# Patient Record
Sex: MALE | Race: WHITE | NOT HISPANIC OR LATINO | Employment: FULL TIME | URBAN - METROPOLITAN AREA
[De-identification: names, ages, dates, MRNs, and addresses within clinical notes are randomized per-mention and may not be internally consistent; named-entity substitution may affect disease eponyms.]

---

## 2017-02-25 ENCOUNTER — APPOINTMENT (EMERGENCY)
Dept: CT IMAGING | Facility: HOSPITAL | Age: 27
End: 2017-02-25
Payer: COMMERCIAL

## 2017-02-25 ENCOUNTER — HOSPITAL ENCOUNTER (EMERGENCY)
Facility: HOSPITAL | Age: 27
Discharge: HOME | End: 2017-02-25
Attending: EMERGENCY MEDICINE | Admitting: EMERGENCY MEDICINE
Payer: COMMERCIAL

## 2017-02-25 VITALS
TEMPERATURE: 98.2 F | HEART RATE: 85 BPM | SYSTOLIC BLOOD PRESSURE: 127 MMHG | OXYGEN SATURATION: 99 % | DIASTOLIC BLOOD PRESSURE: 69 MMHG | RESPIRATION RATE: 20 BRPM | WEIGHT: 181 LBS

## 2017-02-25 DIAGNOSIS — R10.9 ACUTE ABDOMINAL PAIN: Primary | ICD-10-CM

## 2017-02-25 DIAGNOSIS — K52.9 GASTROENTERITIS, ACUTE: ICD-10-CM

## 2017-02-25 LAB
ALBUMIN SERPL BCP-MCNC: 4.4 G/DL (ref 3.5–5)
ALP SERPL-CCNC: 81 U/L (ref 46–116)
ALT SERPL W P-5'-P-CCNC: 24 U/L (ref 12–78)
ANION GAP SERPL CALCULATED.3IONS-SCNC: 10 MMOL/L (ref 4–13)
AST SERPL W P-5'-P-CCNC: 16 U/L (ref 5–45)
BACTERIA UR QL AUTO: ABNORMAL /HPF
BASOPHILS # BLD MANUAL: 0 THOUSAND/UL (ref 0–0.1)
BASOPHILS NFR MAR MANUAL: 0 % (ref 0–1)
BILIRUB SERPL-MCNC: 2.4 MG/DL (ref 0.2–1)
BILIRUB UR QL STRIP: ABNORMAL
BUN SERPL-MCNC: 18 MG/DL (ref 5–25)
CALCIUM SERPL-MCNC: 9 MG/DL (ref 8.3–10.1)
CHLORIDE SERPL-SCNC: 102 MMOL/L (ref 100–108)
CLARITY UR: CLEAR
CO2 SERPL-SCNC: 26 MMOL/L (ref 21–32)
COLOR UR: ABNORMAL
CREAT SERPL-MCNC: 1.29 MG/DL (ref 0.6–1.3)
EOSINOPHIL # BLD MANUAL: 0 THOUSAND/UL (ref 0–0.4)
EOSINOPHIL NFR BLD MANUAL: 0 % (ref 0–6)
ERYTHROCYTE [DISTWIDTH] IN BLOOD BY AUTOMATED COUNT: 12.5 % (ref 11.6–15.1)
GFR SERPL CREATININE-BSD FRML MDRD: >60 ML/MIN/1.73SQ M
GLUCOSE SERPL-MCNC: 134 MG/DL (ref 65–140)
GLUCOSE UR STRIP-MCNC: NEGATIVE MG/DL
HCT VFR BLD AUTO: 52.5 % (ref 36.5–49.3)
HGB BLD-MCNC: 18.4 G/DL (ref 12–17)
HGB UR QL STRIP.AUTO: ABNORMAL
KETONES UR STRIP-MCNC: ABNORMAL MG/DL
LEUKOCYTE ESTERASE UR QL STRIP: NEGATIVE
LIPASE SERPL-CCNC: 58 U/L (ref 73–393)
LYMPHOCYTES # BLD AUTO: 0 % (ref 14–44)
LYMPHOCYTES # BLD AUTO: 0 THOUSAND/UL (ref 0.6–4.47)
MCH RBC QN AUTO: 31 PG (ref 26.8–34.3)
MCHC RBC AUTO-ENTMCNC: 35 G/DL (ref 31.4–37.4)
MCV RBC AUTO: 89 FL (ref 82–98)
MONOCYTES # BLD AUTO: 0.58 THOUSAND/UL (ref 0–1.22)
MONOCYTES NFR BLD: 5 % (ref 4–12)
MUCOUS THREADS UR QL AUTO: ABNORMAL
NEUTROPHILS # BLD MANUAL: 11.03 THOUSAND/UL (ref 1.85–7.62)
NEUTS BAND NFR BLD MANUAL: 8 % (ref 0–8)
NEUTS SEG NFR BLD AUTO: 87 % (ref 43–75)
NITRITE UR QL STRIP: NEGATIVE
NON-SQ EPI CELLS URNS QL MICRO: ABNORMAL /HPF
PH UR STRIP.AUTO: 6.5 [PH] (ref 4.5–8)
PLATELET # BLD AUTO: 231 THOUSANDS/UL (ref 149–390)
PLATELET BLD QL SMEAR: ADEQUATE
PMV BLD AUTO: 11.2 FL (ref 8.9–12.7)
POTASSIUM SERPL-SCNC: 3.5 MMOL/L (ref 3.5–5.3)
PROT SERPL-MCNC: 7.7 G/DL (ref 6.4–8.2)
PROT UR STRIP-MCNC: ABNORMAL MG/DL
RBC # BLD AUTO: 5.93 MILLION/UL (ref 3.88–5.62)
RBC #/AREA URNS AUTO: ABNORMAL /HPF
SODIUM SERPL-SCNC: 138 MMOL/L (ref 136–145)
SP GR UR STRIP.AUTO: 1.01 (ref 1–1.03)
TOTAL CELLS COUNTED SPEC: 100
UROBILINOGEN UR QL STRIP.AUTO: 0.2 E.U./DL
WBC # BLD AUTO: 11.61 THOUSAND/UL (ref 4.31–10.16)
WBC #/AREA URNS AUTO: ABNORMAL /HPF

## 2017-02-25 PROCEDURE — 87086 URINE CULTURE/COLONY COUNT: CPT | Performed by: EMERGENCY MEDICINE

## 2017-02-25 PROCEDURE — 96374 THER/PROPH/DIAG INJ IV PUSH: CPT

## 2017-02-25 PROCEDURE — 80053 COMPREHEN METABOLIC PANEL: CPT | Performed by: EMERGENCY MEDICINE

## 2017-02-25 PROCEDURE — 85007 BL SMEAR W/DIFF WBC COUNT: CPT | Performed by: EMERGENCY MEDICINE

## 2017-02-25 PROCEDURE — 83690 ASSAY OF LIPASE: CPT | Performed by: EMERGENCY MEDICINE

## 2017-02-25 PROCEDURE — 96361 HYDRATE IV INFUSION ADD-ON: CPT

## 2017-02-25 PROCEDURE — 81001 URINALYSIS AUTO W/SCOPE: CPT | Performed by: EMERGENCY MEDICINE

## 2017-02-25 PROCEDURE — 85027 COMPLETE CBC AUTOMATED: CPT | Performed by: EMERGENCY MEDICINE

## 2017-02-25 PROCEDURE — 96376 TX/PRO/DX INJ SAME DRUG ADON: CPT

## 2017-02-25 PROCEDURE — 36415 COLL VENOUS BLD VENIPUNCTURE: CPT | Performed by: EMERGENCY MEDICINE

## 2017-02-25 PROCEDURE — 74177 CT ABD & PELVIS W/CONTRAST: CPT

## 2017-02-25 PROCEDURE — 99284 EMERGENCY DEPT VISIT MOD MDM: CPT

## 2017-02-25 RX ORDER — FENTANYL CITRATE 50 UG/ML
INJECTION, SOLUTION INTRAMUSCULAR; INTRAVENOUS
Status: COMPLETED
Start: 2017-02-25 | End: 2017-02-25

## 2017-02-25 RX ORDER — MORPHINE SULFATE 4 MG/ML
4 INJECTION, SOLUTION INTRAMUSCULAR; INTRAVENOUS ONCE
Status: COMPLETED | OUTPATIENT
Start: 2017-02-25 | End: 2017-02-25

## 2017-02-25 RX ORDER — DICYCLOMINE HCL 20 MG
20 TABLET ORAL 4 TIMES DAILY PRN
Qty: 15 TABLET | Refills: 0 | Status: SHIPPED | OUTPATIENT
Start: 2017-02-25 | End: 2018-03-22

## 2017-02-25 RX ORDER — ONDANSETRON 4 MG/1
4 TABLET, FILM COATED ORAL EVERY 8 HOURS PRN
Qty: 12 TABLET | Refills: 0 | Status: SHIPPED | OUTPATIENT
Start: 2017-02-25 | End: 2018-03-22

## 2017-02-25 RX ORDER — ONDANSETRON 2 MG/ML
INJECTION INTRAMUSCULAR; INTRAVENOUS
Status: COMPLETED
Start: 2017-02-25 | End: 2017-02-25

## 2017-02-25 RX ORDER — HYDROCODONE BITARTRATE AND ACETAMINOPHEN 5; 325 MG/1; MG/1
1 TABLET ORAL EVERY 4 HOURS PRN
Qty: 5 TABLET | Refills: 0 | Status: SHIPPED | OUTPATIENT
Start: 2017-02-25 | End: 2017-02-27

## 2017-02-25 RX ORDER — DICYCLOMINE HCL 20 MG
20 TABLET ORAL ONCE
Status: COMPLETED | OUTPATIENT
Start: 2017-02-25 | End: 2017-02-25

## 2017-02-25 RX ORDER — ALBUTEROL SULFATE 90 UG/1
2 AEROSOL, METERED RESPIRATORY (INHALATION) EVERY 6 HOURS PRN
COMMUNITY

## 2017-02-25 RX ADMIN — SODIUM CHLORIDE 1000 ML: 0.9 INJECTION, SOLUTION INTRAVENOUS at 15:02

## 2017-02-25 RX ADMIN — IOHEXOL 100 ML: 350 INJECTION, SOLUTION INTRAVENOUS at 14:14

## 2017-02-25 RX ADMIN — SODIUM CHLORIDE 1000 ML: 0.9 INJECTION, SOLUTION INTRAVENOUS at 13:01

## 2017-02-25 RX ADMIN — MORPHINE SULFATE 4 MG: 4 INJECTION, SOLUTION INTRAMUSCULAR; INTRAVENOUS at 15:02

## 2017-02-25 RX ADMIN — DICYCLOMINE HYDROCHLORIDE 20 MG: 20 TABLET ORAL at 13:03

## 2017-02-25 RX ADMIN — MORPHINE SULFATE 4 MG: 4 INJECTION, SOLUTION INTRAMUSCULAR; INTRAVENOUS at 13:43

## 2017-02-27 LAB — BACTERIA UR CULT: NORMAL

## 2017-06-02 ENCOUNTER — OFFICE VISIT (OUTPATIENT)
Dept: URGENT CARE | Facility: MEDICAL CENTER | Age: 27
End: 2017-06-02
Payer: COMMERCIAL

## 2017-06-02 PROCEDURE — 99204 OFFICE O/P NEW MOD 45 MIN: CPT

## 2018-03-22 ENCOUNTER — OFFICE VISIT (OUTPATIENT)
Dept: SURGICAL ONCOLOGY | Facility: CLINIC | Age: 28
End: 2018-03-22
Payer: COMMERCIAL

## 2018-03-22 VITALS
HEIGHT: 72 IN | RESPIRATION RATE: 18 BRPM | WEIGHT: 196 LBS | BODY MASS INDEX: 26.55 KG/M2 | SYSTOLIC BLOOD PRESSURE: 140 MMHG | TEMPERATURE: 98.2 F | DIASTOLIC BLOOD PRESSURE: 80 MMHG | HEART RATE: 112 BPM

## 2018-03-22 DIAGNOSIS — N62 GYNECOMASTIA, MALE: Primary | ICD-10-CM

## 2018-03-22 PROBLEM — H00.013 HORDEOLUM OF RIGHT EYE: Status: ACTIVE | Noted: 2017-06-02

## 2018-03-22 PROBLEM — H10.9 CONJUNCTIVITIS, RIGHT EYE: Status: RESOLVED | Noted: 2017-06-02 | Resolved: 2018-03-22

## 2018-03-22 PROBLEM — H10.9 CONJUNCTIVITIS, RIGHT EYE: Status: ACTIVE | Noted: 2017-06-02

## 2018-03-22 PROBLEM — H00.013 HORDEOLUM OF RIGHT EYE: Status: RESOLVED | Noted: 2017-06-02 | Resolved: 2018-03-22

## 2018-03-22 PROBLEM — F31.9 BIPOLAR 1 DISORDER (HCC): Status: ACTIVE | Noted: 2017-12-13

## 2018-03-22 PROCEDURE — 99243 OFF/OP CNSLTJ NEW/EST LOW 30: CPT | Performed by: SURGERY

## 2018-03-22 NOTE — LETTER
March 22, 2018     Anabella Wilks MD  [address]    Patient: Renea Torres   YOB: 1990   Date of Visit: 3/22/2018       Dear Dr Jean John: Thank you for referring Renea Torres to me for evaluation  Below are my notes for this consultation  If you have questions, please do not hesitate to call me  I look forward to following your patient along with you  Sincerely,        Amy Costello MD        CC: No Recipients  Amy Costello MD  3/22/2018  2:27 PM  Sign at close encounter               Surgical Oncology Follow Up       [address] [place] [address] [address]  1990  [id number]  [address] [facility] SURGICAL ONCOLOGY [address] [address]      Chief Complaint:     Chief Complaint   Patient presents with    Breast Mass     Right sided breast mass       Assessment and Plan:   Assessment/Plan   Right breast mass/gynecomastia  Plan bilateral diagnostic mammogram and right ultrasound  Follow-up after imaging    Oncology History:      No history exists  History of Present Illness: The patient was seen in 2014 for small area of gynecomastia on the right side  He was on medications at that time that may have caused this  We had ordered an ultrasound and follow-up however he did not get the ultrasound or follow-up  He has been off those medications and recently has developed in crease seen gynecomastia as well as mastalgia  He now presents for an opinion regarding further management  He is quite tender in this area  Review of Systems:   Review of Systems   Constitutional: Negative for activity change, fatigue and unexpected weight change  HENT: Negative for sore throat, trouble swallowing and voice change  Eyes: Negative for visual disturbance     Respiratory: Negative for apnea, shortness of breath, wheezing and stridor  Cardiovascular: Negative for chest pain and leg swelling  Gastrointestinal: Negative for abdominal distention, abdominal pain, anal bleeding, constipation, diarrhea and vomiting  Genitourinary: Negative for dysuria, flank pain and hematuria  Musculoskeletal: Negative for arthralgias, back pain and myalgias  Skin: Negative for color change and rash  Allergic/Immunologic: Negative for immunocompromised state  Neurological: Negative for dizziness, seizures, syncope, weakness and headaches  Hematological: Negative for adenopathy  Psychiatric/Behavioral: Negative for agitation and behavioral problems  Past Medical History:      Patient Active Problem List   Diagnosis    Anxiety and depression    Asthma    Bipolar 1 disorder ([facility] [address] )    Mass of right breast    Panic attack        Past Medical History:   Diagnosis Date    Asthma         Past Surgical History:   Procedure Laterality Date    FOOT SURGERY      right        Family History   Problem Relation Age of Onset    Breast cancer Mother     Breast cancer Cousin         Social History     Social History    Marital status: /Civil Union     Spouse name: N/A    Number of children: N/A    Years of education: N/A     Occupational History    Not on file       Social History Main Topics    Smoking status: Current Every Day Smoker     Packs/day: 0 50    Smokeless tobacco: Never Used    Alcohol use Yes      Comment: socially    Drug use: No    Sexual activity: Not on file     Other Topics Concern    Not on file     Social History Narrative    No narrative on file        Current Outpatient Prescriptions:     albuterol (PROVENTIL HFA,VENTOLIN HFA) 90 mcg/act inhaler, Inhale 2 puffs every 6 (six) hours as needed for wheezing, Disp: , Rfl:    No Known Allergies    Physical Exam:     Vitals:    03/22/18 1345   BP: 140/80   Pulse: (!) 112   Resp: 18   Temp: 98 2 °F (36 8 °C)     Physical Exam   Pulmonary/Chest:   Examination of the right breast demonstrates relative prominent gynecomastia in the lateral aspect of the breast   Ultrasound interrogation is consistent with gynecomastia as opposed to is single mass  The left breast was entirely normal by ultrasound exam   There is no axillary adenopathy on either side  Results:   Pathology:  Not applicable  Imaging  See physical exam for ultrasound findings    Discussion/Summary:   The patient has a relatively rapid onset by history of increasing gynecomastia which is so she with considerable mastalgia  I have recommended formal imaging consistent with bilateral diagnostic mammogram as well as a right ultrasound  We will see the patient back following that  Given the significant change as well as the discomfort the patient strongly prefers to have this surgically removed  We will discuss this at her next visit though it certainly seems reasonable given his discomfort  Advance Care Planning/Advance Directives:  I discussed the disease status, treatment plans and follow-up with the patient

## 2018-03-22 NOTE — PROGRESS NOTES
Surgical Oncology Follow Up       [address] [facility] [facility]  [facility] [address]  1990  [id number]  [address] [facility] [facility]  [facility] SURGICAL ONCOLOGY [place]  [address] [place] [id number]      Chief Complaint:     Chief Complaint   Patient presents with    Breast Mass     Right sided breast mass       Assessment and Plan:   Assessment/Plan   Right breast mass/gynecomastia  Plan bilateral diagnostic mammogram and right ultrasound  Follow-up after imaging    Oncology History:      No history exists  History of Present Illness: The patient was seen in 2014 for small area of gynecomastia on the right side  He was on medications at that time that may have caused this  We had ordered an ultrasound and follow-up however he did not get the ultrasound or follow-up  He has been off those medications and recently has developed in crease seen gynecomastia as well as mastalgia  He now presents for an opinion regarding further management  He is quite tender in this area  Review of Systems:   Review of Systems   Constitutional: Negative for activity change, fatigue and unexpected weight change  HENT: Negative for sore throat, trouble swallowing and voice change  Eyes: Negative for visual disturbance  Respiratory: Negative for apnea, shortness of breath, wheezing and stridor  Cardiovascular: Negative for chest pain and leg swelling  Gastrointestinal: Negative for abdominal distention, abdominal pain, anal bleeding, constipation, diarrhea and vomiting  Genitourinary: Negative for dysuria, flank pain and hematuria  Musculoskeletal: Negative for arthralgias, back pain and myalgias  Skin: Negative for color change and rash  Allergic/Immunologic: Negative for immunocompromised state  Neurological: Negative for dizziness, seizures, syncope, weakness and headaches  Hematological: Negative for adenopathy  Psychiatric/Behavioral: Negative for agitation and behavioral problems  Past Medical History:      Patient Active Problem List   Diagnosis    Anxiety and depression    Asthma    Bipolar 1 disorder ([address] )    Mass of right breast    Panic attack        Past Medical History:   Diagnosis Date    Asthma         Past Surgical History:   Procedure Laterality Date    FOOT SURGERY      right        Family History   Problem Relation Age of Onset    Breast cancer Mother     Breast cancer Cousin         Social History     Social History    Marital status: /Civil Union     Spouse name: N/A    Number of children: N/A    Years of education: N/A     Occupational History    Not on file  Social History Main Topics    Smoking status: Current Every Day Smoker     Packs/day: 0 50    Smokeless tobacco: Never Used    Alcohol use Yes      Comment: socially    Drug use: No    Sexual activity: Not on file     Other Topics Concern    Not on file     Social History Narrative    No narrative on file        Current Outpatient Prescriptions:     albuterol (PROVENTIL HFA,VENTOLIN HFA) 90 mcg/act inhaler, Inhale 2 puffs every 6 (six) hours as needed for wheezing, Disp: , Rfl:    No Known Allergies    Physical Exam:     Vitals:    03/22/18 1345   BP: 140/80   Pulse: (!) 112   Resp: 18   Temp: 98 2 °F (36 8 °C)     Physical Exam   Pulmonary/Chest:   Examination of the right breast demonstrates relative prominent gynecomastia in the lateral aspect of the breast   Ultrasound interrogation is consistent with gynecomastia as opposed to is single mass  The left breast was entirely normal by ultrasound exam   There is no axillary adenopathy on either side         Results:   Pathology:  Not applicable  Imaging  See physical exam for ultrasound findings    Discussion/Summary:   The patient has a relatively rapid onset by history of increasing gynecomastia which is so she with considerable mastalgia  I have recommended formal imaging consistent with bilateral diagnostic mammogram as well as a right ultrasound  We will see the patient back following that  Given the significant change as well as the discomfort the patient strongly prefers to have this surgically removed  We will discuss this at her next visit though it certainly seems reasonable given his discomfort  Advance Care Planning/Advance Directives:  I discussed the disease status, treatment plans and follow-up with the patient

## 2018-03-30 ENCOUNTER — HOSPITAL ENCOUNTER (OUTPATIENT)
Dept: ULTRASOUND IMAGING | Facility: CLINIC | Age: 28
Discharge: HOME | End: 2018-03-30
Payer: COMMERCIAL

## 2018-03-30 ENCOUNTER — HOSPITAL ENCOUNTER (OUTPATIENT)
Dept: MAMMOGRAPHY | Facility: CLINIC | Age: 28
Discharge: HOME | End: 2018-03-30
Payer: COMMERCIAL

## 2018-03-30 DIAGNOSIS — N62 GYNECOMASTIA, MALE: ICD-10-CM

## 2018-03-30 PROCEDURE — 76642 ULTRASOUND BREAST LIMITED: CPT

## 2018-03-30 PROCEDURE — 77066 DX MAMMO INCL CAD BI: CPT

## 2018-04-05 ENCOUNTER — OFFICE VISIT (OUTPATIENT)
Dept: SURGICAL ONCOLOGY | Facility: CLINIC | Age: 28
End: 2018-04-05
Payer: COMMERCIAL

## 2018-04-05 VITALS
TEMPERATURE: 98.7 F | RESPIRATION RATE: 16 BRPM | DIASTOLIC BLOOD PRESSURE: 70 MMHG | BODY MASS INDEX: 26.46 KG/M2 | HEART RATE: 84 BPM | HEIGHT: 71 IN | SYSTOLIC BLOOD PRESSURE: 108 MMHG | WEIGHT: 189 LBS

## 2018-04-05 DIAGNOSIS — N62 GYNECOMASTIA, MALE: ICD-10-CM

## 2018-04-05 DIAGNOSIS — N63.10 MASS OF RIGHT BREAST: Primary | ICD-10-CM

## 2018-04-05 PROCEDURE — 99214 OFFICE O/P EST MOD 30 MIN: CPT | Performed by: SURGERY

## 2018-04-05 RX ORDER — CARBAMAZEPINE 200 MG/1
400 TABLET ORAL 2 TIMES DAILY
COMMUNITY
End: 2019-10-06

## 2018-04-05 NOTE — PROGRESS NOTES
Surgical Oncology Follow Up       [address] [place] [address]  [facility] [address]  1990  [id number]  [address] [place] [address]  [facility] SURGICAL ONCOLOGY [place] [id number]    Chief Complaint   Patient presents with   Yannick Downy     Pt is here for a two week follow up          Assessment & Plan:   Assessment/Plan   Patient has persistent gynecomastia and mastalgia   I have recommended a right mastectomy, nipple sparing  Cancer History:      No history exists  History of Present Illness: The patient was seen in 2014 for small area of gynecomastia on the right side  He was on medications at that time that may have caused this  We had ordered an ultrasound and follow-up however he did not get the ultrasound or follow-up  He has been off those medications and recently has developed in crease seen gynecomastia as well as mastalgia  He now presents for an opinion regarding further management  He is quite tender in this area  He has had a mammogram and ultrasound which demonstrated no evidence of a mass however does have significant unilateral gynecomastia  Interval History:   See above    Review of Systems:   Review of Systems   Constitutional: Negative for activity change, fatigue and unexpected weight change  HENT: Negative for sore throat, trouble swallowing and voice change  Eyes: Negative for visual disturbance  Respiratory: Negative for apnea, shortness of breath, wheezing and stridor  Cardiovascular: Negative for chest pain and leg swelling  Gastrointestinal: Negative for abdominal distention, abdominal pain, anal bleeding, constipation, diarrhea and vomiting  Genitourinary: Negative for dysuria, flank pain and hematuria  Musculoskeletal: Negative for arthralgias, back pain and myalgias  Skin: Negative for color change and rash  Allergic/Immunologic: Negative for immunocompromised state  Neurological: Negative for dizziness, seizures, syncope, weakness and headaches  Hematological: Negative for adenopathy  Psychiatric/Behavioral: Negative for agitation and behavioral problems  Past Medical History     Patient Active Problem List   Diagnosis    Anxiety and depression    Asthma    Bipolar 1 disorder ([address] )    Mass of right breast    Panic attack     Past Medical History:   Diagnosis Date    Asthma      Past Surgical History:   Procedure Laterality Date    FOOT SURGERY      right     Family History   Problem Relation Age of Onset    Breast cancer Mother     Breast cancer Cousin      Social History     Social History    Marital status: /Civil Union     Spouse name: N/A    Number of children: N/A    Years of education: N/A     Occupational History    Not on file  Social History Main Topics    Smoking status: Current Every Day Smoker     Packs/day: 0 50    Smokeless tobacco: Never Used    Alcohol use Yes      Comment: socially    Drug use: No    Sexual activity: Not on file     Other Topics Concern    Not on file     Social History Narrative    No narrative on file       Current Outpatient Prescriptions:     carBAMazepine (TEGretol) 200 mg tablet, Take 400 mg by mouth 2 (two) times a day, Disp: , Rfl:     albuterol (PROVENTIL HFA,VENTOLIN HFA) 90 mcg/act inhaler, Inhale 2 puffs every 6 (six) hours as needed for wheezing, Disp: , Rfl:   No Known Allergies    Physical Exam:     Vitals:    04/05/18 0845   BP: 108/70   Pulse: 84   Resp: 16   Temp: 98 7 °F (37 1 °C)     Physical Exam   Pulmonary/Chest:       Patient has obvious gynecomastia on exam   This is quite tender  There is no axillary adenopathy  The left breast was entirely normal          Results:   I reviewed the mammogram and ultrasound I concur with report      Discussion/Summary:   Given the patient's progressive gynecomastia as well as mastalgia this is unlikely to be relieved by vitamin-E and/or primrose oil  I recommended a nipple sparing right mastectomy  We subsequent to the process of informed consent for this procedure  All questions were answered the patient's satisfaction  We will coordinate the surgery next mutual convenience  Advance Care Planning/Advance Directives:  I discussed the disease status, treatment plans and follow-up with the patient

## 2018-04-05 NOTE — PATIENT INSTRUCTIONS
Mastectomy   AMBULATORY CARE:   What you need to know about a mastectomy:  A mastectomy is surgery to remove all or part of your breast  Tissue, lymph nodes, or muscle near the breast may also be removed  A mastectomy is done to treat breast cancer and prevent cancer from spreading  A mastectomy can also be done to prevent breast cancer  This may be a choice if you are at high risk for breast cancer  The type of mastectomy you need may depend on the size of the tumor  It may also depend on if the cancer has spread  How to prepare for a mastectomy:  Your healthcare provider will talk to you about how to prepare for surgery  He may tell you not to eat or drink anything after midnight on the day of your surgery  He will tell you what medicines to take or not take on the day of your surgery  You may need to stop taking aspirin or blood thinners several days before surgery  Arrange for someone to drive you home and stay with you after surgery  This person can help care for you and watch for complications from surgery  What will happen during a mastectomy:   · You will be given general anesthesia to keep you asleep and free from pain during surgery  You may be given an antibiotic through your IV to help prevent a bacterial infection  Your healthcare provider will make an incision over your breast  He will remove the tumor and breast tissue  He may also remove muscle from behind your breast  If lymph nodes will be taken, a small incision will be made in your armpit  The lymph nodes will be removed and tested for cancer  · One or more drains may be inserted near your incision  A drain removes extra fluid and helps your incision heal  Your healthcare provider will close your incision with stitches and cover it with a bandage  He may also wrap a tight-fitting bandage around both of your breasts  This may decrease swelling, bleeding, and pain    What will happen after a mastectomy:  Healthcare providers will monitor you until you are awake  You may need to spend 1 to 2 nights in the hospital  You may have difficulty moving your arm closest to your mastectomy  This should get better in a few days  Get out of bed and walk when your healthcare provider says it is safe  This will help prevent blood clots  Risks of a mastectomy:  You may bleed more than expected or get an infection  Nerves, blood vessels, and muscles may be damaged during your surgery  Blood or fluid may collect under your skin  You may need other procedures to remove the fluid or blood  You may have swelling in your arm closest to the mastectomy or where lymph nodes were removed  This swelling is called lymphedema  Lymphedema may cause tingling, numbness, stiffness, and weakness in your arm  This may be permanent  You may get a blood clot in your arm or leg  The blood clot may travel to your heart, lungs, or brain  This may become life-threatening  Call 911 for any of the following:   · You feel lightheaded, short of breath, and have chest pain  · You cough up blood  · You have trouble breathing  Seek care immediately if:   · Blood soaks through your bandage  · Your stitches come apart  · Your bruise suddenly gets bigger  · Your leg or arm is larger than normal and painful  Contact your healthcare provider if:   · You have a fever or chills  · Your wound is red, swollen, or draining pus  · You have nausea or are vomiting  · Your skin is itchy, swollen, or you have a rash  · Your pain does not get better after you take pain medicine  · Your drain falls out or stops draining fluid  · Your drain has pus or foul-smelling fluid coming out of it  · You have numbness, tingling, or swelling in your arm or hand  · You feel very sad or anxious  · You have trouble coping with your condition  · You have questions or concerns about your condition or care  Medicines:   You may need any of the following:  · Antibiotics  help prevent a bacterial infection  · Prescription pain medicine  may be given  Ask your healthcare provider how to take this medicine safely  Some prescription pain medicines contain acetaminophen  Do not take other medicines that contain acetaminophen without talking to your healthcare provider  Too much acetaminophen may cause liver damage  Prescription pain medicine may cause constipation  Ask your healthcare provider how to prevent or treat constipation  · NSAIDs , such as ibuprofen, help decrease swelling, pain, and fever  NSAIDs can cause stomach bleeding or kidney problems in certain people  If you take blood thinner medicine, always ask your healthcare provider if NSAIDs are safe for you  Always read the medicine label and follow directions  · Take your medicine as directed  Contact your healthcare provider if you think your medicine is not helping or if you have side effects  Tell him or her if you are allergic to any medicine  Keep a list of the medicines, vitamins, and herbs you take  Include the amounts, and when and why you take them  Bring the list or the pill bottles to follow-up visits  Carry your medicine list with you in case of an emergency  Care for your wound as directed: If you have a tight-fitting bandage, you can remove it in 24 to 48 hours, or as directed  Ask your healthcare provider when your incision can get wet  You may need to take a sponge bath until your drain is removed  Carefully wash around the incision with soap and water  It is okay to allow the soap and water to gently run over your incision  Gently pat dry the area and put on new, clean bandages as directed  Change your bandages when they get wet or dirty  If lymph nodes were removed from your armpit, ask your healthcare provider when you can wear deodorant  Check your incision every day for redness, pus, or swelling  Self-care:   · Apply ice  on your incision for 15 to 20 minutes every hour or as directed   Use an ice pack, or put crushed ice in a plastic bag  Cover it with a towel  Ice helps prevent tissue damage and decreases swelling and pain  · Elevate  your arm nearest to your incision above the level of your heart  Do this as often as you can  This will help decrease swelling and pain  Prop your arm on pillows or blankets to keep it elevated comfortably  · Rest  as directed  Do not lift anything heavier than 5 pounds  Do not push or pull with your arms  You can use your arms to groom, eat, and bathe  Take short walks around the house  Gradually walk further as you feel better  Ask your healthcare provider when you can return to your normal activities  · Do not sleep on your stomach  This will put too much pressure on your incision  Sleep on your back or on the side opposite to your incision  · Empty your drain  as directed  You may need to write down how much fluid you empty from your drain each day  Ask your healthcare provider for more information about how to empty your drain  · Wear a supportive bra  as directed  Wait until you remove the tight-fitting bandage to wear a bra  You may be given a surgical bra or told to wear a sports bra  A supportive bra may help hold your bandages in place  It may also help with swelling and pain  Do not  wear bras with lace or underwire  They may rub against your incision and cause discomfort  Arm stretches: Your healthcare provider may show you how to do arm stretches  Arm stretches may prevent stiff arms or shoulders  You may need to wait until after your drains are removed to begin stretching  Do not do arm stretches until your healthcare provider says it is okay  Ask your healthcare provider for more information about arm stretches  More information and support: You may have difficulty coping with the changes to your body  Talk to your family or friends about how you are feeling  Ask your healthcare provider about support groups   It may be helpful to talk with other women who have had a mastectomy  · [address]  [place] [address]  Phone: [phone]  Web Address: http://[employer]/  [employer]  · [address] [place] [address], [address] [place] [address]  Phone: 8- 436 - [phone]  Web Address: http://[employer]/  gov  Follow up with your healthcare provider as directed:  Write down your questions so you remember to ask them during your visits  © 2017 [facility] Information is for End User's use only and may not be sold, redistributed or otherwise used for commercial purposes  All illustrations and images included in CareNotes® are the copyrighted property of A D A M [employer] Inc  or Surendra Perez  The above information is an  only  It is not intended as medical advice for individual conditions or treatments  Talk to your doctor, nurse or pharmacist before following any medical regimen to see if it is safe and effective for you

## 2018-04-05 NOTE — LETTER
April 5, 2018     Threglendaa Ora  [facility]    Patient: Eneida Jha   YOB: 1990   Date of Visit: 4/5/2018       Dear Dr Lucio Matar: Thank you for referring Eneida Jha to me for evaluation  Below are my notes for this consultation  If you have questions, please do not hesitate to call me  I look forward to following your patient along with you  Sincerely,        Inés Bustos MD        CC: No Recipients  Inés Bustos MD  4/5/2018  9:09 AM  Sign at close encounter     Surgical Oncology Follow Up       [address] [place] [address] [facility]  1990  [id number]  [address] [facility] SURGICAL ONCOLOGY [place] [address]    Chief Complaint   Patient presents with   Michelle Licona     Pt is here for a two week follow up          Assessment & Plan:   Assessment/Plan   Patient has persistent gynecomastia and mastalgia   I have recommended a right mastectomy, nipple sparing  Cancer History:      No history exists  History of Present Illness: The patient was seen in 2014 for small area of gynecomastia on the right side  He was on medications at that time that may have caused this  We had ordered an ultrasound and follow-up however he did not get the ultrasound or follow-up  He has been off those medications and recently has developed in crease seen gynecomastia as well as mastalgia  He now presents for an opinion regarding further management  He is quite tender in this area  He has had a mammogram and ultrasound which demonstrated no evidence of a mass however does have significant unilateral gynecomastia  Interval History:   See above    Review of Systems:   Review of Systems   Constitutional: Negative for activity change, fatigue and unexpected weight change     HENT: Negative for sore throat, trouble swallowing and voice change  Eyes: Negative for visual disturbance  Respiratory: Negative for apnea, shortness of breath, wheezing and stridor  Cardiovascular: Negative for chest pain and leg swelling  Gastrointestinal: Negative for abdominal distention, abdominal pain, anal bleeding, constipation, diarrhea and vomiting  Genitourinary: Negative for dysuria, flank pain and hematuria  Musculoskeletal: Negative for arthralgias, back pain and myalgias  Skin: Negative for color change and rash  Allergic/Immunologic: Negative for immunocompromised state  Neurological: Negative for dizziness, seizures, syncope, weakness and headaches  Hematological: Negative for adenopathy  Psychiatric/Behavioral: Negative for agitation and behavioral problems  Past Medical History     Patient Active Problem List   Diagnosis    Anxiety and depression    Asthma    Bipolar 1 disorder ([facility] [address] )    Mass of right breast    Panic attack     Past Medical History:   Diagnosis Date    Asthma      Past Surgical History:   Procedure Laterality Date    FOOT SURGERY      right     Family History   Problem Relation Age of Onset    Breast cancer Mother     Breast cancer Cousin      Social History     Social History    Marital status: /Civil Union     Spouse name: N/A    Number of children: N/A    Years of education: N/A     Occupational History    Not on file       Social History Main Topics    Smoking status: Current Every Day Smoker     Packs/day: 0 50    Smokeless tobacco: Never Used    Alcohol use Yes      Comment: socially    Drug use: No    Sexual activity: Not on file     Other Topics Concern    Not on file     Social History Narrative    No narrative on file       Current Outpatient Prescriptions:     carBAMazepine (TEGretol) 200 mg tablet, Take 400 mg by mouth 2 (two) times a day, Disp: , Rfl:     albuterol (PROVENTIL HFA,VENTOLIN HFA) 90 mcg/act inhaler, Inhale 2 puffs every 6 (six) hours as needed for wheezing, Disp: , Rfl:   No Known Allergies    Physical Exam:     Vitals:    04/05/18 0845   BP: 108/70   Pulse: 84   Resp: 16   Temp: 98 7 °F (37 1 °C)     Physical Exam   Pulmonary/Chest:       Patient has obvious gynecomastia on exam   This is quite tender  There is no axillary adenopathy  The left breast was entirely normal          Results:   I reviewed the mammogram and ultrasound I concur with report  Discussion/Summary:   Given the patient's progressive gynecomastia as well as mastalgia this is unlikely to be relieved by vitamin-E and/or primrose oil  I recommended a nipple sparing right mastectomy  We subsequent to the process of informed consent for this procedure  All questions were answered the patient's satisfaction  We will coordinate the surgery next mutual convenience  Advance Care Planning/Advance Directives:  I discussed the disease status, treatment plans and follow-up with the patient

## 2018-04-11 ENCOUNTER — APPOINTMENT (OUTPATIENT)
Dept: RADIOLOGY | Facility: CLINIC | Age: 28
End: 2018-04-11
Payer: COMMERCIAL

## 2018-04-11 ENCOUNTER — TRANSCRIBE ORDERS (OUTPATIENT)
Dept: LAB | Facility: CLINIC | Age: 28
End: 2018-04-11

## 2018-04-11 ENCOUNTER — APPOINTMENT (OUTPATIENT)
Dept: LAB | Facility: CLINIC | Age: 28
End: 2018-04-11
Payer: COMMERCIAL

## 2018-04-11 ENCOUNTER — TRANSCRIBE ORDERS (OUTPATIENT)
Dept: RADIOLOGY | Facility: CLINIC | Age: 28
End: 2018-04-11

## 2018-04-11 DIAGNOSIS — N62 GYNECOMASTIA, MALE: ICD-10-CM

## 2018-04-11 DIAGNOSIS — N63.10 MASS OF RIGHT BREAST: ICD-10-CM

## 2018-04-11 PROCEDURE — 93005 ELECTROCARDIOGRAM TRACING: CPT

## 2018-04-11 PROCEDURE — 71046 X-RAY EXAM CHEST 2 VIEWS: CPT

## 2018-04-11 NOTE — PRE-PROCEDURE INSTRUCTIONS
Pre-Surgery Instructions:   Medication Instructions    albuterol (PROVENTIL HFA,VENTOLIN HFA) 90 mcg/act inhaler Instructed patient per Anesthesia Guidelines   carBAMazepine (TEGretol) 200 mg tablet Instructed patient per Anesthesia Guidelines  Pre op and bathing instructions reviewed   Pt will get hibiclens

## 2018-04-16 ENCOUNTER — ANESTHESIA EVENT (OUTPATIENT)
Dept: PERIOP | Facility: HOSPITAL | Age: 28
End: 2018-04-16
Payer: COMMERCIAL

## 2018-04-16 RX ORDER — DEXTROAMPHETAMINE SACCHARATE, AMPHETAMINE ASPARTATE, DEXTROAMPHETAMINE SULFATE AND AMPHETAMINE SULFATE 2.5; 2.5; 2.5; 2.5 MG/1; MG/1; MG/1; MG/1
10 TABLET ORAL DAILY
COMMUNITY
End: 2020-10-31 | Stop reason: CLARIF

## 2018-04-17 ENCOUNTER — HOSPITAL ENCOUNTER (OUTPATIENT)
Facility: HOSPITAL | Age: 28
Setting detail: OUTPATIENT SURGERY
Discharge: HOME | End: 2018-04-17
Attending: SURGERY | Admitting: SURGERY
Payer: COMMERCIAL

## 2018-04-17 ENCOUNTER — ANESTHESIA (OUTPATIENT)
Dept: PERIOP | Facility: HOSPITAL | Age: 28
End: 2018-04-17
Payer: COMMERCIAL

## 2018-04-17 VITALS
BODY MASS INDEX: 25.03 KG/M2 | WEIGHT: 195 LBS | TEMPERATURE: 97.9 F | SYSTOLIC BLOOD PRESSURE: 140 MMHG | DIASTOLIC BLOOD PRESSURE: 87 MMHG | HEIGHT: 74 IN | HEART RATE: 81 BPM | OXYGEN SATURATION: 96 % | RESPIRATION RATE: 18 BRPM

## 2018-04-17 DIAGNOSIS — N62 GYNECOMASTIA, MALE: ICD-10-CM

## 2018-04-17 DIAGNOSIS — N63.10 MASS OF RIGHT BREAST: ICD-10-CM

## 2018-04-17 PROCEDURE — 19303 MAST SIMPLE COMPLETE: CPT | Performed by: SURGERY

## 2018-04-17 PROCEDURE — 19303 MAST SIMPLE COMPLETE: CPT | Performed by: PHYSICIAN ASSISTANT

## 2018-04-17 PROCEDURE — 88307 TISSUE EXAM BY PATHOLOGIST: CPT | Performed by: PATHOLOGY

## 2018-04-17 RX ORDER — OXYCODONE HYDROCHLORIDE AND ACETAMINOPHEN 5; 325 MG/1; MG/1
1 TABLET ORAL EVERY 4 HOURS PRN
Status: DISCONTINUED | OUTPATIENT
Start: 2018-04-17 | End: 2018-04-17 | Stop reason: HOSPADM

## 2018-04-17 RX ORDER — GLYCOPYRROLATE 0.2 MG/ML
INJECTION INTRAMUSCULAR; INTRAVENOUS AS NEEDED
Status: DISCONTINUED | OUTPATIENT
Start: 2018-04-17 | End: 2018-04-17 | Stop reason: SURG

## 2018-04-17 RX ORDER — MIDAZOLAM HYDROCHLORIDE 1 MG/ML
INJECTION INTRAMUSCULAR; INTRAVENOUS AS NEEDED
Status: DISCONTINUED | OUTPATIENT
Start: 2018-04-17 | End: 2018-04-17 | Stop reason: SURG

## 2018-04-17 RX ORDER — SODIUM CHLORIDE, SODIUM LACTATE, POTASSIUM CHLORIDE, CALCIUM CHLORIDE 600; 310; 30; 20 MG/100ML; MG/100ML; MG/100ML; MG/100ML
125 INJECTION, SOLUTION INTRAVENOUS CONTINUOUS
Status: DISCONTINUED | OUTPATIENT
Start: 2018-04-17 | End: 2018-04-17 | Stop reason: HOSPADM

## 2018-04-17 RX ORDER — MAGNESIUM HYDROXIDE 1200 MG/15ML
LIQUID ORAL AS NEEDED
Status: DISCONTINUED | OUTPATIENT
Start: 2018-04-17 | End: 2018-04-17 | Stop reason: HOSPADM

## 2018-04-17 RX ORDER — BUPIVACAINE HYDROCHLORIDE AND EPINEPHRINE 2.5; 5 MG/ML; UG/ML
INJECTION, SOLUTION EPIDURAL; INFILTRATION; INTRACAUDAL; PERINEURAL AS NEEDED
Status: DISCONTINUED | OUTPATIENT
Start: 2018-04-17 | End: 2018-04-17 | Stop reason: HOSPADM

## 2018-04-17 RX ORDER — FENTANYL CITRATE 50 UG/ML
INJECTION, SOLUTION INTRAMUSCULAR; INTRAVENOUS AS NEEDED
Status: DISCONTINUED | OUTPATIENT
Start: 2018-04-17 | End: 2018-04-17 | Stop reason: SURG

## 2018-04-17 RX ORDER — KETOROLAC TROMETHAMINE 30 MG/ML
INJECTION, SOLUTION INTRAMUSCULAR; INTRAVENOUS AS NEEDED
Status: DISCONTINUED | OUTPATIENT
Start: 2018-04-17 | End: 2018-04-17 | Stop reason: SURG

## 2018-04-17 RX ORDER — FENTANYL CITRATE/PF 50 MCG/ML
25 SYRINGE (ML) INJECTION
Status: DISCONTINUED | OUTPATIENT
Start: 2018-04-17 | End: 2018-04-17 | Stop reason: HOSPADM

## 2018-04-17 RX ORDER — PROPOFOL 10 MG/ML
INJECTION, EMULSION INTRAVENOUS AS NEEDED
Status: DISCONTINUED | OUTPATIENT
Start: 2018-04-17 | End: 2018-04-17 | Stop reason: SURG

## 2018-04-17 RX ORDER — SODIUM CHLORIDE 9 MG/ML
INJECTION, SOLUTION INTRAVENOUS CONTINUOUS PRN
Status: DISCONTINUED | OUTPATIENT
Start: 2018-04-17 | End: 2018-04-17 | Stop reason: SURG

## 2018-04-17 RX ORDER — ONDANSETRON 2 MG/ML
INJECTION INTRAMUSCULAR; INTRAVENOUS AS NEEDED
Status: DISCONTINUED | OUTPATIENT
Start: 2018-04-17 | End: 2018-04-17 | Stop reason: SURG

## 2018-04-17 RX ADMIN — PROPOFOL 200 MG: 10 INJECTION, EMULSION INTRAVENOUS at 08:07

## 2018-04-17 RX ADMIN — FENTANYL CITRATE 50 MCG: 50 INJECTION INTRAMUSCULAR; INTRAVENOUS at 08:39

## 2018-04-17 RX ADMIN — FENTANYL CITRATE 25 MCG: 50 INJECTION, SOLUTION INTRAMUSCULAR; INTRAVENOUS at 09:12

## 2018-04-17 RX ADMIN — FENTANYL CITRATE 50 MCG: 50 INJECTION INTRAMUSCULAR; INTRAVENOUS at 08:16

## 2018-04-17 RX ADMIN — FENTANYL CITRATE 25 MCG: 50 INJECTION, SOLUTION INTRAMUSCULAR; INTRAVENOUS at 09:20

## 2018-04-17 RX ADMIN — KETOROLAC TROMETHAMINE 30 MG: 30 INJECTION, SOLUTION INTRAMUSCULAR at 08:37

## 2018-04-17 RX ADMIN — CEFAZOLIN SODIUM 2000 MG: 2 SOLUTION INTRAVENOUS at 08:03

## 2018-04-17 RX ADMIN — DEXAMETHASONE SODIUM PHOSPHATE 10 MG: 10 INJECTION INTRAMUSCULAR; INTRAVENOUS at 08:07

## 2018-04-17 RX ADMIN — OXYCODONE HYDROCHLORIDE AND ACETAMINOPHEN 1 TABLET: 5; 325 TABLET ORAL at 10:05

## 2018-04-17 RX ADMIN — MIDAZOLAM HYDROCHLORIDE 2 MG: 1 INJECTION, SOLUTION INTRAMUSCULAR; INTRAVENOUS at 08:03

## 2018-04-17 RX ADMIN — ONDANSETRON 4 MG: 2 INJECTION INTRAMUSCULAR; INTRAVENOUS at 08:37

## 2018-04-17 RX ADMIN — FENTANYL CITRATE 25 MCG: 50 INJECTION, SOLUTION INTRAMUSCULAR; INTRAVENOUS at 09:25

## 2018-04-17 RX ADMIN — GLYCOPYRROLATE 0.2 MG: 0.2 INJECTION, SOLUTION INTRAMUSCULAR; INTRAVENOUS at 08:07

## 2018-04-17 RX ADMIN — FENTANYL CITRATE 25 MCG: 50 INJECTION, SOLUTION INTRAMUSCULAR; INTRAVENOUS at 09:31

## 2018-04-17 RX ADMIN — SODIUM CHLORIDE: 0.9 INJECTION, SOLUTION INTRAVENOUS at 07:39

## 2018-04-17 NOTE — ANESTHESIA POSTPROCEDURE EVALUATION
Post-Op Assessment Note      CV Status:  Stable    Hydration Status:  Euvolemic    PONV Controlled:  Controlled    Airway Patency:  Patent    Post Op Vitals Reviewed: Yes          Staff: CRNA       Comments: sleeping vss report rn          BP      Temp      Pulse     Resp      SpO2

## 2018-04-17 NOTE — OP NOTE
OPERATIVE REPORT  PATIENT NAME: Rubén Ladd    :  1990  MRN: [id number]  Pt Location: [facility] OR ROOM 02    SURGERY DATE: 2018    Surgeon(s) and Role:     * Fahad Lowry MD - Primary     * Meredith Elaine PA-C - Assisting    Preop Diagnosis:  Gynecomastia, male [N62]  Mass of right breast [N63 10]    Post-Op Diagnosis Codes:     * Gynecomastia, male [N62]     * Mass of right breast [N63 10]    Procedure(s) (LRB):  MASTECTOMY SIMPLE: NIPPLE SPARING (Right)    Specimen(s):  ID Type Source Tests Collected by Time Destination   1 : right breast, sutures short superior, long lateral, medium medial Tissue Breast, Right TISSUE EXAM Fahad Lowyr MD 2018 [id number]        Estimated Blood Loss:   Minimal    Drains: none       Anesthesia Type:   General    Operative Indications:  Gynecomastia, male [N62]  Mass of right breast [N63 10]      Operative Findings:  Significant gynecomastia with dense breast tissue    Complications:   None    Procedure and Technique:  The patient was brought to the operation room and placed under general anesthesia   Attention was paid to ensure appropriate padding and correct positioning   The right breast was prepped and draped in a sterile fashion   I initiated a time-out, identifying the patient, the correct side and the above procedure   All parties agreed with the time out      The planned vic-areolar  incision was then injected with 0 25% Marcaine and epinephrine for local anesthesia   The incision was sharply incised and extended for 1 cm laterally   Thin flaps were then elevated using electrocautery starting superiorly and then continuing medially, inferiorly and finally laterally   The tail of Subha Fly was then dissected away from the axilla proper leaving the breast tethered to the underlying musculature   The breast was then removed from the muscles in a sub-facial plane   The breast was oriented with sutures (short=superior; medium=medial; long=lateral)   The breast was sent to pathology in formalin for permanent pathologic evaluation  Meticulous hemostasis was maintained with electrocautery   The wound was extensively irrigated closed in two layers - an inner layer of 2-0 vicryl and a subcuticular closure with 4-0 Monocryl  Steri-strips were applied  The counts were correct x2       A qualified resident physician was not available    Patient Disposition:  PACU     SIGNATURE: Tex Edwards MD  DATE: April 17, 2018  TIME: 8:44 AM

## 2018-04-17 NOTE — ANESTHESIA PREPROCEDURE EVALUATION
Review of Systems/Medical History      No history of anesthetic complications     Cardiovascular  Negative cardio ROS    Pulmonary  Smoker (marijuana use daily) , Asthma: Asthma type of rescue: PRN inhaler,        GI/Hepatic  Negative GI/hepatic ROS          Negative  ROS        Endo/Other    Comment: Gynecomastia      GYN       Hematology  Negative hematology ROS      Musculoskeletal  Negative musculoskeletal ROS        Neurology  Negative neurology ROS      Psychology   Anxiety, Depression , bipolar disorder,              Physical Exam    Airway    Mallampati score: II  TM Distance: >3 FB  Neck ROM: full     Dental   No notable dental hx     Cardiovascular  Comment: Negative ROS,     Pulmonary      Other Findings        Anesthesia Plan  ASA Score- 2     Anesthesia Type- general with ASA Monitors  Additional Monitors:   Airway Plan: LMA  Comment: GA with LMA, IV, antiemetics  Plan Factors-Patient not instructed to abstain from smoking on day of procedure  Patient did not smoke on day of surgery  Induction- intravenous  Postoperative Plan-   Planned trial extubation    Informed Consent- Anesthetic plan and risks discussed with patient  I personally reviewed this patient with the CRNA  Discussed and agreed on the Anesthesia Plan with the CRNA  Jennifer March

## 2018-04-17 NOTE — DISCHARGE INSTRUCTIONS
POST-OPERATIVE BREAST CARE INSTRUCTIONS    Wound Closure/Dressing: Your wound is closed with:   Steri strips-white pieces of tape that hold your incision together along with surgical glue           Dissolvable sutures-underneath the skin    Wound care:     If you have gauze over your wound you may remove it the day after surgery  Leave the Steri-strips on, they will fall off on their own in 1-2 weeks   You may shower using soap and water to clean your wound  Gently pat dry  Drain Care: If you do not have a drain, disregard this section   Visiting Nursing should have been arranged upon discharge from hospital   A nurse will call your home to schedule an initial visit  Please call the number below if you have not received a call within 48 hours of hospital discharge   You may shower with the JEY drains  Wash area around the drains with soap and water and pat dry   Record drain outputs on chart given to you at pre op visit and bring that chart to office at post op appt   JEY drains can be removed in the office by a nurse either at post op visit OR when drain output is 30 ccs or less in a 24 hour period for three days in a row   If you had plastic surgery or reconstructive surgery, the plastic surgeon will manage the drains  Other:        You may resume using deodorant the day after surgery                 Post-op visit:  your post-op visit is scheduled for:    May 2, 2018 @ 2PM    Call our office at [phone] if you have any  of the followin  Redness, swelling, heat, unusual drainage or heavy bleeding from wound  2  Fever greater than 101 °  3  Pain not relieved by medication

## 2018-04-17 NOTE — H&P (VIEW-ONLY)
Surgical Oncology Follow Up       [address] [place] [address],[address]  [facility] SURGICAL ONCOL[facility] [address] [place] [address]  1990  [id number]  [address] [facility],[address] [facility]  [facility] [place]  [address] [facility] [address] [address]    Chief Complaint   Patient presents with   [place]     Pt is here for a two week follow up          Assessment & Plan:   Assessment/Plan   Patient has persistent gynecomastia and mastalgia   I have recommended a right mastectomy, nipple sparing  Cancer History:      No history exists  History of Present Illness: The patient was seen in 2014 for small area of gynecomastia on the right side  He was on medications at that time that may have caused this  We had ordered an ultrasound and follow-up however he did not get the ultrasound or follow-up  He has been off those medications and recently has developed in crease seen gynecomastia as well as mastalgia  He now presents for an opinion regarding further management  He is quite tender in this area  He has had a mammogram and ultrasound which demonstrated no evidence of a mass however does have significant unilateral gynecomastia  Interval History:   See above    Review of Systems:   Review of Systems   Constitutional: Negative for activity change, fatigue and unexpected weight change  HENT: Negative for sore throat, trouble swallowing and voice change  Eyes: Negative for visual disturbance  Respiratory: Negative for apnea, shortness of breath, wheezing and stridor  Cardiovascular: Negative for chest pain and leg swelling  Gastrointestinal: Negative for abdominal distention, abdominal pain, anal bleeding, constipation, diarrhea and vomiting  Genitourinary: Negative for dysuria, flank pain and hematuria  Musculoskeletal: Negative for arthralgias, back pain and myalgias  Skin: Negative for color change and rash  Allergic/Immunologic: Negative for immunocompromised state  Neurological: Negative for dizziness, seizures, syncope, weakness and headaches  Hematological: Negative for adenopathy  Psychiatric/Behavioral: Negative for agitation and behavioral problems  Past Medical History     Patient Active Problem List   Diagnosis    Anxiety and depression    Asthma    Bipolar 1 disorder ([address] )    Mass of right breast    Panic attack     Past Medical History:   Diagnosis Date    Asthma      Past Surgical History:   Procedure Laterality Date    FOOT SURGERY      right     Family History   Problem Relation Age of Onset    Breast cancer Mother     Breast cancer Cousin      Social History     Social History    Marital status: /Civil Union     Spouse name: N/A    Number of children: N/A    Years of education: N/A     Occupational History    Not on file  Social History Main Topics    Smoking status: Current Every Day Smoker     Packs/day: 0 50    Smokeless tobacco: Never Used    Alcohol use Yes      Comment: socially    Drug use: No    Sexual activity: Not on file     Other Topics Concern    Not on file     Social History Narrative    No narrative on file       Current Outpatient Prescriptions:     carBAMazepine (TEGretol) 200 mg tablet, Take 400 mg by mouth 2 (two) times a day, Disp: , Rfl:     albuterol (PROVENTIL HFA,VENTOLIN HFA) 90 mcg/act inhaler, Inhale 2 puffs every 6 (six) hours as needed for wheezing, Disp: , Rfl:   No Known Allergies    Physical Exam:     Vitals:    04/05/18 0845   BP: 108/70   Pulse: 84   Resp: 16   Temp: 98 7 °F (37 1 °C)     Physical Exam   Pulmonary/Chest:       Patient has obvious gynecomastia on exam   This is quite tender  There is no axillary adenopathy  The left breast was entirely normal          Results:   I reviewed the mammogram and ultrasound I concur with report      Discussion/Summary:   Given the patient's progressive gynecomastia as well as mastalgia this is unlikely to be relieved by vitamin-E and/or primrose oil  I recommended a nipple sparing right mastectomy  We subsequent to the process of informed consent for this procedure  All questions were answered the patient's satisfaction  We will coordinate the surgery next mutual convenience  Advance Care Planning/Advance Directives:  I discussed the disease status, treatment plans and follow-up with the patient

## 2018-04-18 DIAGNOSIS — G89.18 POST-OPERATIVE PAIN: Primary | ICD-10-CM

## 2018-04-18 RX ORDER — OXYCODONE HYDROCHLORIDE AND ACETAMINOPHEN 5; 325 MG/1; MG/1
1 TABLET ORAL EVERY 6 HOURS PRN
Qty: 6 TABLET | Refills: 0 | Status: SHIPPED | OUTPATIENT
Start: 2018-04-18 | End: 2019-10-06

## 2018-04-18 NOTE — PROGRESS NOTES
Patient has been having pain despite using Aleve and Motrin  I have given him a script for Percocet with 6 tablets  He continues use the ice pack

## 2018-04-23 ENCOUNTER — TELEPHONE (OUTPATIENT)
Dept: SURGICAL ONCOLOGY | Facility: CLINIC | Age: 28
End: 2018-04-23

## 2018-04-23 PROBLEM — E55.9 VITAMIN D DEFICIENCY: Status: ACTIVE | Noted: 2018-04-11

## 2018-04-23 PROBLEM — N64.89 SEROMA OF BREAST: Status: ACTIVE | Noted: 2018-04-23

## 2018-04-23 RX ORDER — ERGOCALCIFEROL 1.25 MG/1
50000 CAPSULE ORAL WEEKLY
Refills: 0 | COMMUNITY
Start: 2018-04-11

## 2018-04-23 RX ORDER — ERGOCALCIFEROL (VITAMIN D2) 1250 MCG
50000 CAPSULE ORAL
COMMUNITY
Start: 2018-04-11 | End: 2018-07-04

## 2018-04-23 RX ORDER — CARBAMAZEPINE 400 MG/1
400 TABLET, EXTENDED RELEASE ORAL 2 TIMES DAILY
Refills: 0 | COMMUNITY
Start: 2018-04-05 | End: 2020-10-31 | Stop reason: CLARIF

## 2018-04-23 NOTE — TELEPHONE ENCOUNTER
Patient called regarding post op swelling and accumulation of fluid which is increasing his pain  Appointment offered for today, patient cannot make it in today  Appointment given to patient for 7:45 am on Wednesday

## 2018-04-25 ENCOUNTER — OFFICE VISIT (OUTPATIENT)
Dept: SURGICAL ONCOLOGY | Facility: CLINIC | Age: 28
End: 2018-04-25

## 2018-04-25 VITALS
WEIGHT: 189 LBS | RESPIRATION RATE: 18 BRPM | HEIGHT: 74 IN | TEMPERATURE: 98.3 F | BODY MASS INDEX: 24.26 KG/M2 | HEART RATE: 107 BPM | DIASTOLIC BLOOD PRESSURE: 80 MMHG | SYSTOLIC BLOOD PRESSURE: 118 MMHG

## 2018-04-25 DIAGNOSIS — N62 GYNECOMASTIA, MALE: ICD-10-CM

## 2018-04-25 DIAGNOSIS — N64.89 SEROMA OF BREAST: Primary | ICD-10-CM

## 2018-04-25 PROCEDURE — 99024 POSTOP FOLLOW-UP VISIT: CPT | Performed by: SURGERY

## 2018-04-25 NOTE — LETTER
April 25, 2018     Kathrin Hammans  [facility]    Patient: Pilar Cowart   YOB: 1990   Date of Visit: 4/25/2018       Dear Dr Tip Angel: Thank you for referring Pilar Cowart to me for evaluation  Below are my notes for this consultation  If you have questions, please do not hesitate to call me  I look forward to following your patient along with you  Sincerely,        Yany Aguila MD        CC: No Recipients  Yany Aguila MD  4/25/2018  8:11 AM  Sign at close encounter     Surgical Oncology Breast Post-Op       [address] [place] [address],[address] [place] [address]  [facility] SURGICAL ONCOLOGY [place] [address]  1990  [id number]  [address] [place] [address] SURGICAL ONCOLOGY [place] [address]    Chief Complaint:   Raquel Coffman is seen for a post-operative visit of his recent right breast surgery  Oncology History:      No history exists  Assessment & Plan:   Assessment/Plan      History of Present Illness:   Patient had developed gynecomastia on the right breast she had gone through mastectomy for significant mastalgia as well as to exclude a malignancy  Interval History:   Patient developed a fluid collection and presents now for postoperative visit  The pathology demonstrated gynecomastia but no cancer      Review of Systems:   Review of Systems    Past Medical History:     Patient Active Problem List   Diagnosis    Anxiety and depression    Asthma    [facility]    Panic attack    Gynecomastia, male    Vitamin D deficiency    Seroma of breast     Past Medical History:   Diagnosis Date    Asthma     [facility]      Past Surgical History:   Procedure Laterality Date    FOOT SURGERY Right 2014    SIMPLE MASTECTOMY Right 4/17/2018    Procedure: MASTECTOMY SIMPLE: NIPPLE SPARING;  Surgeon: Yany Aguila MD;  [facility]: [facility];  Service: Surgical Oncology    WISDOM TOOTH EXTRACTION       Family History   Problem Relation Age of Onset    Breast cancer Mother     Breast cancer Cousin      Social History     Social History    Marital status: /Civil Union     Spouse name: N/A    Number of children: N/A    Years of education: N/A     Occupational History    Not on file  Social History Main Topics    Smoking status: Former Smoker     Packs/day: 0 50     Quit date: 1/11/2018    Smokeless tobacco: Never Used      Comment: nicotine pouch 3mg 5 times per day    Alcohol use Yes      Comment: socially    Drug use: Yes     Types: Marijuana      Comment: daily    Sexual activity: Not on file     Other Topics Concern    Not on file     Social History Narrative    No narrative on file       Current Outpatient Prescriptions:     ergocalciferol (ERGOCALCIFEROL) 04982 units capsule, Take 50,000 Units by mouth, Disp: , Rfl:     albuterol (PROVENTIL HFA,VENTOLIN HFA) 90 mcg/act inhaler, Inhale 2 puffs every 6 (six) hours as needed for wheezing, Disp: , Rfl:     amphetamine-dextroamphetamine (ADDERALL, 5MG,) 5 MG tablet, Take 15 mg by mouth daily, Disp: , Rfl:     carBAMazepine (TEGretol XR) 400 mg 12 hr tablet, Take 400 mg by mouth 2 (two) times a day, Disp: , Rfl: 0    carBAMazepine (TEGretol) 200 mg tablet, Take 400 mg by mouth 2 (two) times a day, Disp: , Rfl:     ergocalciferol (VITAMIN D2) 50,000 units, Take 50,000 Units by mouth once a week, Disp: , Rfl: 0    oxyCODONE-acetaminophen (PERCOCET) 5-325 mg per tablet, Take 1 tablet by mouth every 6 (six) hours as needed for moderate pain Max Daily Amount: 4 tablets, Disp: 6 tablet, Rfl: 0  No Known Allergies    Physical Exams:     Vitals:    04/25/18 0751   BP: 118/80   Pulse: (!) 107   Resp: 18   Temp: 98 3 °F (36 8 °C)     Physical Exam   Pulmonary/Chest:   Examination of the right breast demonstrates possible seroma    After informed consent the area was prepped at approximately 60 cc of old blood was aspirated to completion  A compressive dressing was applied  The patient tolerated the procedure well  Results:       Labs:       Discussion/Summary:   Patient tolerated the procedure well  I explained that his hematoma may recur and if it does he is to contact us prior to becoming the size that it was  I reviewed his pathology with him and provided him a copy of his pathology report  We will see him back on as-needed basis

## 2018-04-25 NOTE — PROGRESS NOTES
Surgical Oncology Breast Post-Op       [address] [facility],[address] [place] [address]  [facility] [address] [place] [address]  1990  [id number]  [address] [place] [address],[address] [place] [address]  [facility] [address] [address]    Chief Complaint:   Natanael Hussein is seen for a post-operative visit of his recent right breast surgery  Oncology History:      No history exists  Assessment & Plan:   Assessment/Plan      History of Present Illness:   Patient had developed gynecomastia on the right breast she had gone through mastectomy for significant mastalgia as well as to exclude a malignancy  Interval History:   Patient developed a fluid collection and presents now for postoperative visit  The pathology demonstrated gynecomastia but no cancer  Review of Systems:   Review of Systems    Past Medical History:     Patient Active Problem List   Diagnosis    Anxiety and depression    Asthma    [facility]    Panic attack    Gynecomastia, male    Vitamin D deficiency    Seroma of breast     Past Medical History:   Diagnosis Date    Asthma     [facility] disorder ([facility] )      Past Surgical History:   Procedure Laterality Date    FOOT SURGERY Right 2014    SIMPLE MASTECTOMY Right 4/17/2018    Procedure: MASTECTOMY SIMPLE: NIPPLE SPARING;  Surgeon: Fatou Carballo MD;  [facility];  Service: Surgical Oncology    WISDOM TOOTH EXTRACTION       Family History   Problem Relation Age of Onset    Breast cancer Mother     Breast cancer Cousin      Social History     Social History    Marital status: /Civil Union     Spouse name: N/A    Number of children: N/A    Years of education: N/A     Occupational History    Not on file       Social History Main Topics    Smoking status: Former Smoker     Packs/day: 0 50     Quit date: 1/11/2018    Smokeless tobacco: Never Used      Comment: nicotine pouch 3mg 5 times per day    Alcohol use Yes      Comment: socially    Drug use: Yes     Types: Marijuana      Comment: daily    Sexual activity: Not on file     Other Topics Concern    Not on file     Social History Narrative    No narrative on file       Current Outpatient Prescriptions:     ergocalciferol (ERGOCALCIFEROL) 36783 units capsule, Take 50,000 Units by mouth, Disp: , Rfl:     albuterol (PROVENTIL HFA,VENTOLIN HFA) 90 mcg/act inhaler, Inhale 2 puffs every 6 (six) hours as needed for wheezing, Disp: , Rfl:     amphetamine-dextroamphetamine (ADDERALL, 5MG,) 5 MG tablet, Take 15 mg by mouth daily, Disp: , Rfl:     carBAMazepine (TEGretol XR) 400 mg 12 hr tablet, Take 400 mg by mouth 2 (two) times a day, Disp: , Rfl: 0    carBAMazepine (TEGretol) 200 mg tablet, Take 400 mg by mouth 2 (two) times a day, Disp: , Rfl:     ergocalciferol (VITAMIN D2) 50,000 units, Take 50,000 Units by mouth once a week, Disp: , Rfl: 0    oxyCODONE-acetaminophen (PERCOCET) 5-325 mg per tablet, Take 1 tablet by mouth every 6 (six) hours as needed for moderate pain Max Daily Amount: 4 tablets, Disp: 6 tablet, Rfl: 0  No Known Allergies    Physical Exams:     Vitals:    04/25/18 0751   BP: 118/80   Pulse: (!) 107   Resp: 18   Temp: 98 3 °F (36 8 °C)     Physical Exam   Pulmonary/Chest:   Examination of the right breast demonstrates possible seroma  After informed consent the area was prepped at approximately 60 cc of old blood was aspirated to completion  A compressive dressing was applied  The patient tolerated the procedure well  Results:       Labs:       Discussion/Summary:   Patient tolerated the procedure well  I explained that his hematoma may recur and if it does he is to contact us prior to becoming the size that it was  I reviewed his pathology with him and provided him a copy of his pathology report  We will see him back on as-needed basis

## 2018-04-30 ENCOUNTER — OFFICE VISIT (OUTPATIENT)
Dept: SURGICAL ONCOLOGY | Facility: CLINIC | Age: 28
End: 2018-04-30

## 2018-04-30 VITALS
WEIGHT: 184 LBS | BODY MASS INDEX: 23.61 KG/M2 | RESPIRATION RATE: 12 BRPM | SYSTOLIC BLOOD PRESSURE: 128 MMHG | TEMPERATURE: 98.3 F | DIASTOLIC BLOOD PRESSURE: 88 MMHG | HEIGHT: 74 IN | HEART RATE: 60 BPM

## 2018-04-30 DIAGNOSIS — N62 GYNECOMASTIA, MALE: Primary | ICD-10-CM

## 2018-04-30 PROCEDURE — 99024 POSTOP FOLLOW-UP VISIT: CPT | Performed by: SURGERY

## 2018-04-30 RX ORDER — DEXTROAMPHETAMINE SACCHARATE, AMPHETAMINE ASPARTATE MONOHYDRATE, DEXTROAMPHETAMINE SULFATE AND AMPHETAMINE SULFATE 6.25; 6.25; 6.25; 6.25 MG/1; MG/1; MG/1; MG/1
25 CAPSULE, EXTENDED RELEASE ORAL EVERY MORNING
COMMUNITY
End: 2020-10-31 | Stop reason: CLARIF

## 2018-04-30 NOTE — LETTER
April 30, 2018     Ana Hicks  [address] [facility] [address]    Patient: Adolfo Martinez   YOB: 1990   Date of Visit: 4/30/2018       Dear Dr Darron Grossman: Thank you for referring Adolfo Martinez to me for evaluation  Below are my notes for this consultation  If you have questions, please do not hesitate to call me  I look forward to following your patient along with you  Sincerely,        Susanna Rodriguez MD        CC: No Recipients  Susanna Rodriguez MD  4/30/2018 12:06 PM  Sign at close encounter     Surgical Oncology Follow Up       [address] [place] [address] [address]  1990  [id number]  [address] [place] [id number]    No chief complaint on file  Assessment & Plan:   Assessment/Plan   Postop hematoma/seroma  Cancer History:      No history exists  History of Present Illness: The patient has recurrent hematoma from his right mastectomy for gynecomastia  Interval History:   See above    Review of Systems:   Review of Systems   All other systems reviewed and are negative        Past Medical History     Patient Active Problem List   Diagnosis    Anxiety and depression    Asthma    Bipolar 1 disorder ([facility] )    Panic attack    Gynecomastia, male    Vitamin D deficiency    Seroma of breast     Past Medical History:   Diagnosis Date    Asthma     Bipolar disorder ([facility] )      Past Surgical History:   Procedure Laterality Date    FOOT SURGERY Right 2014    SIMPLE MASTECTOMY Right 4/17/2018    Procedure: MASTECTOMY SIMPLE: NIPPLE SPARING;  Surgeon: Susanna Rodriguez MD;  [facility];  Service: Surgical Oncology    WISDOM TOOTH EXTRACTION       Family History   Problem Relation Age of Onset    Breast cancer Mother     Breast cancer Cousin      Social History     Social History    Marital status: /Civil [employer]     Spouse name: N/A    Number of children: N/A    Years of education: N/A     Occupational History    Not on file  Social History Main Topics    Smoking status: Former Smoker     Packs/day: 0 50     Quit date: 1/11/2018    Smokeless tobacco: Never Used      Comment: nicotine pouch 3mg 5 times per day    Alcohol use Yes      Comment: socially    Drug use: Yes     Types: Marijuana      Comment: daily    Sexual activity: Not on file     Other Topics Concern    Not on file     Social History Narrative    No narrative on file       Current Outpatient Prescriptions:     albuterol (PROVENTIL HFA,VENTOLIN HFA) 90 mcg/act inhaler, Inhale 2 puffs every 6 (six) hours as needed for wheezing, Disp: , Rfl:     amphetamine-dextroamphetamine (ADDERALL XR, 25MG,) 25 MG 24 hr capsule, Take 25 mg by mouth every morning, Disp: , Rfl:     amphetamine-dextroamphetamine (ADDERALL, 5MG,) 5 MG tablet, Take 15 mg by mouth daily, Disp: , Rfl:     carBAMazepine (TEGretol XR) 400 mg 12 hr tablet, Take 400 mg by mouth 2 (two) times a day, Disp: , Rfl: 0    carBAMazepine (TEGretol) 200 mg tablet, Take 400 mg by mouth 2 (two) times a day, Disp: , Rfl:     ergocalciferol (ERGOCALCIFEROL) 97830 units capsule, Take 50,000 Units by mouth, Disp: , Rfl:     ergocalciferol (VITAMIN D2) 50,000 units, Take 50,000 Units by mouth once a week, Disp: , Rfl: 0    oxyCODONE-acetaminophen (PERCOCET) 5-325 mg per tablet, Take 1 tablet by mouth every 6 (six) hours as needed for moderate pain Max Daily Amount: 4 tablets, Disp: 6 tablet, Rfl: 0    sertraline (ZOLOFT) 50 mg tablet, TAKE 1/2-1 TABLET BY MOUTH EVERY MORNING, Disp: , Rfl: 0  No Known Allergies    Physical Exam:     Vitals:    04/30/18 1146   BP: 128/88   Pulse: 60   Resp: 12   Temp: 98 3 °F (36 8 °C)     Physical Exam   Pulmonary/Chest:   Examination of the a seroma hematoma demonstrates its large than before  There is no evidence of infection    After informed consent the area was prepped and aspirated  The material was old blood mixed with serous fluid  No evidence of an active bleed or infection  It was aspirated to completion on clinical basis  Results:       Discussion/Summary:   Patient tolerated the procedure well  We will see him back as previously scheduled  An Ace wrap was placed to provide compression  Advance Care Planning/Advance Directives:  I discussed the disease status, treatment plans and follow-up with the patient

## 2018-04-30 NOTE — PROGRESS NOTES
Surgical Oncology Follow Up       [address]  [facility] [address]  1990  [id number]  [address] [facility] [address] [address]    No chief complaint on file  Assessment & Plan:   Assessment/Plan   Postop hematoma/seroma  Cancer History:      No history exists  History of Present Illness: The patient has recurrent hematoma from his right mastectomy for gynecomastia  Interval History:   See above    Review of Systems:   Review of Systems   All other systems reviewed and are negative  Past Medical History     Patient Active Problem List   Diagnosis    Anxiety and depression    Asthma    [facility]    Panic attack    Gynecomastia, male    Vitamin D deficiency    Seroma of breast     Past Medical History:   Diagnosis Date    Asthma     Bipolar disorder ([facility] )      Past Surgical History:   Procedure Laterality Date    FOOT SURGERY Right 2014    SIMPLE MASTECTOMY Right 4/17/2018    Procedure: MASTECTOMY SIMPLE: NIPPLE SPARING;  Surgeon: Tiffanie Garza MD;  [facility];  Service: Surgical Oncology    WISDOM TOOTH EXTRACTION       Family History   Problem Relation Age of Onset    Breast cancer Mother     Breast cancer Cousin      Social History     Social History    Marital status: /Civil Union     Spouse name: N/A    Number of children: N/A    Years of education: N/A     Occupational History    Not on file       Social History Main Topics    Smoking status: Former Smoker     Packs/day: 0 50     Quit date: 1/11/2018    Smokeless tobacco: Never Used      Comment: nicotine pouch 3mg 5 times per day    Alcohol use Yes      Comment: socially    Drug use: Yes     Types: Marijuana      Comment: daily    Sexual activity: Not on file     Other Topics Concern    Not on file     Social History Narrative    No narrative on file       Current Outpatient Prescriptions:     albuterol (PROVENTIL HFA,VENTOLIN HFA) 90 mcg/act inhaler, Inhale 2 puffs every 6 (six) hours as needed for wheezing, Disp: , Rfl:     amphetamine-dextroamphetamine (ADDERALL XR, 25MG,) 25 MG 24 hr capsule, Take 25 mg by mouth every morning, Disp: , Rfl:     amphetamine-dextroamphetamine (ADDERALL, 5MG,) 5 MG tablet, Take 15 mg by mouth daily, Disp: , Rfl:     carBAMazepine (TEGretol XR) 400 mg 12 hr tablet, Take 400 mg by mouth 2 (two) times a day, Disp: , Rfl: 0    carBAMazepine (TEGretol) 200 mg tablet, Take 400 mg by mouth 2 (two) times a day, Disp: , Rfl:     ergocalciferol (ERGOCALCIFEROL) 99199 units capsule, Take 50,000 Units by mouth, Disp: , Rfl:     ergocalciferol (VITAMIN D2) 50,000 units, Take 50,000 Units by mouth once a week, Disp: , Rfl: 0    oxyCODONE-acetaminophen (PERCOCET) 5-325 mg per tablet, Take 1 tablet by mouth every 6 (six) hours as needed for moderate pain Max Daily Amount: 4 tablets, Disp: 6 tablet, Rfl: 0    sertraline (ZOLOFT) 50 mg tablet, TAKE 1/2-1 TABLET BY MOUTH EVERY MORNING, Disp: , Rfl: 0  No Known Allergies    Physical Exam:     Vitals:    04/30/18 1146   BP: 128/88   Pulse: 60   Resp: 12   Temp: 98 3 °F (36 8 °C)     Physical Exam   Pulmonary/Chest:   Examination of the a seroma hematoma demonstrates its large than before  There is no evidence of infection  After informed consent the area was prepped and aspirated  The material was old blood mixed with serous fluid  No evidence of an active bleed or infection  It was aspirated to completion on clinical basis  Results:       Discussion/Summary:   Patient tolerated the procedure well  We will see him back as previously scheduled  An Ace wrap was placed to provide compression  Advance Care Planning/Advance Directives:  I discussed the disease status, treatment plans and follow-up with the patient

## 2018-05-04 ENCOUNTER — OFFICE VISIT (OUTPATIENT)
Dept: SURGICAL ONCOLOGY | Facility: CLINIC | Age: 28
End: 2018-05-04

## 2018-05-04 VITALS
SYSTOLIC BLOOD PRESSURE: 128 MMHG | RESPIRATION RATE: 14 BRPM | WEIGHT: 186 LBS | HEART RATE: 72 BPM | TEMPERATURE: 99.4 F | DIASTOLIC BLOOD PRESSURE: 84 MMHG | HEIGHT: 74 IN | BODY MASS INDEX: 23.87 KG/M2

## 2018-05-04 DIAGNOSIS — N64.89 SEROMA OF BREAST: Primary | ICD-10-CM

## 2018-05-04 PROCEDURE — 99024 POSTOP FOLLOW-UP VISIT: CPT | Performed by: SURGERY

## 2018-05-04 NOTE — PROGRESS NOTES
Surgical Oncology Follow Up       [address] [facility]  [address]  [place] [address] [facility] [address]  [address] [facility]  1990  [id number]  [facility]  [address]  [place] [address] [address] [phone]    Diagnoses and all orders for this visit:    Seroma of breast        Chief Complaint   Patient presents with    Post-op seroma     Pt is here to have seroma drained  No Follow-up on file  No history exists  History of Present Illness:   Patient comes in because he has a recurrence fluid collection underneath his right breast   He wishes to have this aspirated  Review of Systems  Complete ROS Surg Onc:   Complete ROS Surg Onc:   Constitutional: The patient denies new or recent history of general fatigue, no recent weight loss, no change in appetite  Eyes: No complaints of visual problems, no scleral icterus  ENT: no complaints of ear pain, no hoarseness, no difficulty swallowing,  no tinnitus and no new masses in head, oral cavity, or neck  Cardiovascular: No complaints of chest pain, no palpitations, no ankle edema  Respiratory: No complaints of shortness of breath, no cough  Gastrointestinal: No complaints of jaundice, no bloody stools, no pale stools  Genitourinary: No complaints of dysuria, no hematuria, no nocturia, no frequent urination, no urethral discharge  Musculoskeletal: No complaints of weakness, paralysis, joint stiffness or arthralgias  Integumentary: No complaints of rash, no new lesions  Neurological: No complaints of convulsions, no seizures, no dizziness  Hematologic/Lymphatic: No complaints of easy bruising  Endocrine:  No hot or cold intolerance  No polydipsia, polyphagia, or polyuria  Allergy/immunology:  No environmental allergies  No food allergies    Not immunocompromised  Skin:  No pallor or rash  No wound  Patient Active Problem List   Diagnosis    Anxiety and depression    Asthma    Bipolar 1 disorder ([facility] )    Panic attack    Gynecomastia, male    Vitamin D deficiency    Seroma of breast     Past Medical History:   Diagnosis Date    Asthma     Bipolar disorder ([facility] )      Past Surgical History:   Procedure Laterality Date    FOOT SURGERY Right 2014    SIMPLE MASTECTOMY Right 4/17/2018    Procedure: MASTECTOMY SIMPLE: NIPPLE SPARING;  Surgeon: Francy Maradiaga MD;  [facility];  Service: Surgical Oncology    WISDOM TOOTH EXTRACTION       Family History   Problem Relation Age of Onset    Breast cancer Mother     Breast cancer Cousin      Social History     Social History    Marital status: /Civil Union     Spouse name: N/A    Number of children: N/A    Years of education: N/A     Occupational History    Not on file       Social History Main Topics    Smoking status: Former Smoker     Packs/day: 0 50     Quit date: 1/11/2018    Smokeless tobacco: Never Used      Comment: nicotine pouch 3mg 5 times per day    Alcohol use Yes      Comment: socially    Drug use: Yes     Types: Marijuana      Comment: daily    Sexual activity: Not on file     Other Topics Concern    Not on file     Social History Narrative    No narrative on file       Current Outpatient Prescriptions:     albuterol (PROVENTIL HFA,VENTOLIN HFA) 90 mcg/act inhaler, Inhale 2 puffs every 6 (six) hours as needed for wheezing, Disp: , Rfl:     amphetamine-dextroamphetamine (ADDERALL XR, 25MG,) 25 MG 24 hr capsule, Take 25 mg by mouth every morning, Disp: , Rfl:     amphetamine-dextroamphetamine (ADDERALL, 5MG,) 5 MG tablet, Take 15 mg by mouth daily, Disp: , Rfl:     carBAMazepine (TEGretol XR) 400 mg 12 hr tablet, Take 400 mg by mouth 2 (two) times a day, Disp: , Rfl: 0    carBAMazepine (TEGretol) 200 mg tablet, Take 400 mg by mouth 2 (two) times a day, Disp: , Rfl:     ergocalciferol (ERGOCALCIFEROL) 06632 units capsule, Take 50,000 Units by mouth, Disp: , Rfl:     ergocalciferol (VITAMIN D2) 50,000 units, Take 50,000 Units by mouth once a week, Disp: , Rfl: 0    oxyCODONE-acetaminophen (PERCOCET) 5-325 mg per tablet, Take 1 tablet by mouth every 6 (six) hours as needed for moderate pain Max Daily Amount: 4 tablets, Disp: 6 tablet, Rfl: 0    sertraline (ZOLOFT) 50 mg tablet, TAKE 1/2-1 TABLET BY MOUTH EVERY MORNING, Disp: , Rfl: 0  No Known Allergies  Vitals:    05/04/18 1313   BP: 128/84   Pulse: 72   Resp: 14   Temp: 99 4 °F (37 4 °C)       Physical Exam  Constitutional: General appearance: The Patient is well-developed and well-nourished who appears the stated age in no acute distress  Patient is pleasant and talkative  HEENT:  Normocephalic  Sclerae are anicteric  Mucous membranes are moist      Extremities: There is no clubbing or cyanosis  There is no edema  Symmetric  Neuro: Grossly nonfocal  Gait is normal       Skin: Warm, anicteric  Psych:  Patient is pleasant and talkative  Breasts: There is an obvious seroma in his right breast         Pathology:      Labs:      Imaging  Xr Chest Pa & Lateral    Result Date: 4/13/2018  Narrative: CHEST INDICATION:   N63 10: Unspecified lump in the right breast, unspecified quadrant N62: Hypertrophy of breast  COMPARISON:  November 11, 2013 EXAM PERFORMED/VIEWS:  XR CHEST PA & LATERAL FINDINGS: Cardiomediastinal silhouette appears unremarkable  The lungs are clear  No pneumothorax or pleural effusion  Osseous structures appear within normal limits for patient age  Impression: No acute cardiopulmonary disease  Workstation performed: [id number]     I reviewed the above laboratory and imaging data  Procedure:  Under sterile conditions and local anesthesia, 50 cc of cold liquefied hematoma was removed  There was no residual seroma at the conclusion of the procedure   He tolerated this well     Discussion/Summary:   52-year-old male status post right gynecomastia surgery with a recurrent seroma  This was aspirated to completion  An Ace wrap was placed for compression  He will follow up with Dr Severa Liberty  All of his questions were answered

## 2018-05-04 NOTE — LETTER
May 4, 2018     Magali Chester  [place] [address]    Patient: Candice Navas   YOB: 1990   Date of Visit: 5/4/2018       Dear Dr Nadine Can: Thank you for referring Candice Navas to me for evaluation  Below are my notes for this consultation  If you have questions, please do not hesitate to call me  I look forward to following your patient along with you  Sincerely,        Claudia Longo MD        CC: MD Claudia Mireles MD  5/4/2018  1:44 PM  Sign at close encounter               Surgical Oncology Follow Up       [facility]  [address]  [address] [address] [address] [facility]  1990  [id number]  [address] [facility] [place]  [address]  [facility] [address] [facility] [facility]  [phone]    Diagnoses and all orders for this visit:    Seroma of breast        Chief Complaint   Patient presents with    Post-op seroma     Pt is here to have seroma drained  No Follow-up on file  No history exists  History of Present Illness:   Patient comes in because he has a recurrence fluid collection underneath his right breast   He wishes to have this aspirated  Review of Systems  Complete ROS Surg Onc:   Complete ROS Surg Onc:   Constitutional: The patient denies new or recent history of general fatigue, no recent weight loss, no change in appetite  Eyes: No complaints of visual problems, no scleral icterus  ENT: no complaints of ear pain, no hoarseness, no difficulty swallowing,  no tinnitus and no new masses in head, oral cavity, or neck  Cardiovascular: No complaints of chest pain, no palpitations, no ankle edema  Respiratory: No complaints of shortness of breath, no cough  Gastrointestinal: No complaints of jaundice, no bloody stools, no pale stools     Genitourinary: No complaints of dysuria, no hematuria, no nocturia, no frequent urination, no urethral discharge  Musculoskeletal: No complaints of weakness, paralysis, joint stiffness or arthralgias  Integumentary: No complaints of rash, no new lesions  Neurological: No complaints of convulsions, no seizures, no dizziness  Hematologic/Lymphatic: No complaints of easy bruising  Endocrine:  No hot or cold intolerance  No polydipsia, polyphagia, or polyuria  Allergy/immunology:  No environmental allergies  No food allergies  Not immunocompromised  Skin:  No pallor or rash  No wound  Patient Active Problem List   Diagnosis    Anxiety and depression    Asthma    Bipolar 1 disorder ([facility] )    Panic attack    Gynecomastia, male    Vitamin D deficiency    Seroma of breast     Past Medical History:   Diagnosis Date    Asthma     Bipolar disorder ([place] [address] )      Past Surgical History:   Procedure Laterality Date    FOOT SURGERY Right 2014    SIMPLE MASTECTOMY Right 4/17/2018    Procedure: MASTECTOMY SIMPLE: NIPPLE SPARING;  Surgeon: Francy Maradiaga MD;  [facility];  Service: Surgical Oncology    WISDOM TOOTH EXTRACTION       Family History   Problem Relation Age of Onset    Breast cancer Mother     Breast cancer Cousin      Social History     Social History    Marital status: /Civil Union     Spouse name: N/A    Number of children: N/A    Years of education: N/A     Occupational History    Not on file       Social History Main Topics    Smoking status: Former Smoker     Packs/day: 0 50     Quit date: 1/11/2018    Smokeless tobacco: Never Used      Comment: nicotine pouch 3mg 5 times per day    Alcohol use Yes      Comment: socially    Drug use: Yes     Types: Marijuana      Comment: daily    Sexual activity: Not on file     Other Topics Concern    Not on file     Social History Narrative    No narrative on file       Current Outpatient Prescriptions:     albuterol (Zollie Starcher HFA) 90 mcg/act inhaler, Inhale 2 puffs every 6 (six) hours as needed for wheezing, Disp: , Rfl:     amphetamine-dextroamphetamine (ADDERALL XR, 25MG,) 25 MG 24 hr capsule, Take 25 mg by mouth every morning, Disp: , Rfl:     amphetamine-dextroamphetamine (ADDERALL, 5MG,) 5 MG tablet, Take 15 mg by mouth daily, Disp: , Rfl:     carBAMazepine (TEGretol XR) 400 mg 12 hr tablet, Take 400 mg by mouth 2 (two) times a day, Disp: , Rfl: 0    carBAMazepine (TEGretol) 200 mg tablet, Take 400 mg by mouth 2 (two) times a day, Disp: , Rfl:     ergocalciferol (ERGOCALCIFEROL) 05813 units capsule, Take 50,000 Units by mouth, Disp: , Rfl:     ergocalciferol (VITAMIN D2) 50,000 units, Take 50,000 Units by mouth once a week, Disp: , Rfl: 0    oxyCODONE-acetaminophen (PERCOCET) 5-325 mg per tablet, Take 1 tablet by mouth every 6 (six) hours as needed for moderate pain Max Daily Amount: 4 tablets, Disp: 6 tablet, Rfl: 0    sertraline (ZOLOFT) 50 mg tablet, TAKE 1/2-1 TABLET BY MOUTH EVERY MORNING, Disp: , Rfl: 0  No Known Allergies  Vitals:    05/04/18 1313   BP: 128/84   Pulse: 72   Resp: 14   Temp: 99 4 °F (37 4 °C)       Physical Exam  Constitutional: General appearance: The Patient is well-developed and well-nourished who appears the stated age in no acute distress  Patient is pleasant and talkative  HEENT:  Normocephalic  Sclerae are anicteric  Mucous membranes are moist      Extremities: There is no clubbing or cyanosis  There is no edema  Symmetric  Neuro: Grossly nonfocal  Gait is normal       Skin: Warm, anicteric  Psych:  Patient is pleasant and talkative  Breasts:   There is an obvious seroma in his right breast         Pathology:      Labs:      Imaging  Xr Chest Pa & Lateral    Result Date: 4/13/2018  Narrative: CHEST INDICATION:   N63 10: Unspecified lump in the right breast, unspecified quadrant N62: Hypertrophy of breast  COMPARISON:  November 11, 2013 EXAM PERFORMED/VIEWS:  XR CHEST PA & LATERAL FINDINGS: Cardiomediastinal silhouette appears unremarkable  The lungs are clear  No pneumothorax or pleural effusion  Osseous structures appear within normal limits for patient age  Impression: No acute cardiopulmonary disease  Workstation performed: [id number]     I reviewed the above laboratory and imaging data  Procedure:  Under sterile conditions and local anesthesia, 50 cc of cold liquefied hematoma was removed  There was no residual seroma at the conclusion of the procedure  He tolerated this well  Discussion/Summary:   27-year-old male status post right gynecomastia surgery with a recurrent seroma  This was aspirated to completion  An Ace wrap was placed for compression  He will follow up with Dr Rosa Cote  All of his questions were answered

## 2018-05-11 ENCOUNTER — OFFICE VISIT (OUTPATIENT)
Dept: SURGICAL ONCOLOGY | Facility: CLINIC | Age: 28
End: 2018-05-11

## 2018-05-11 VITALS
BODY MASS INDEX: 23.23 KG/M2 | HEIGHT: 74 IN | DIASTOLIC BLOOD PRESSURE: 68 MMHG | TEMPERATURE: 98 F | RESPIRATION RATE: 18 BRPM | HEART RATE: 108 BPM | WEIGHT: 181 LBS | SYSTOLIC BLOOD PRESSURE: 98 MMHG

## 2018-05-11 DIAGNOSIS — N64.89 SEROMA OF BREAST: Primary | ICD-10-CM

## 2018-05-11 PROCEDURE — 99024 POSTOP FOLLOW-UP VISIT: CPT | Performed by: SURGERY

## 2018-05-11 NOTE — LETTER
May 11, 2018     Doris Mckeon  [place] [address]    Patient: Lora Sharma   YOB: 1990   Date of Visit: 5/11/2018       Dear Dr Ko Méndez: Thank you for referring Lora Sharma to me for evaluation  Below are my notes for this consultation  If you have questions, please do not hesitate to call me  I look forward to following your patient along with you  Sincerely,        Tex Edwards MD        CC: No Recipients  Tex Edwards MD  5/11/2018 12:22 PM  Sign at close encounter     Surgical Oncology Breast Post-Op       [address]  1990  [id number]  [address] [facility] SURGICAL ONCOLOGY [address] [address]    Chief Complaint:   Marty Bland is seen for a post-operative visit of his recent right breast surgery  Oncology History:      No history exists  Assessment & Plan:   Assessment/Plan      History of Present Illness:   Patient presents after his mastectomy to drain his seroma  This has been drained 3 times      Interval History:   See above    Review of Systems:   Review of Systems    Past Medical History:     Patient Active Problem List   Diagnosis    Anxiety and depression    Asthma    Bipolar 1 disorder ([facility] )    Panic attack    Gynecomastia, male    Vitamin D deficiency    Seroma of breast     Past Medical History:   Diagnosis Date    Asthma     Bipolar disorder ([facility] )      Past Surgical History:   Procedure Laterality Date    FOOT SURGERY Right 2014    SIMPLE MASTECTOMY Right 4/17/2018    Procedure: MASTECTOMY SIMPLE: NIPPLE SPARING;  Surgeon: Tex Edwards MD;  [facility];  Service: Surgical Oncology    WISDOM TOOTH EXTRACTION       Family History   Problem Relation Age of Onset    Breast cancer Mother     Breast cancer Cousin      Social History Social History    Marital status: /Civil Union     Spouse name: N/A    Number of children: N/A    Years of education: N/A     Occupational History    Not on file       Social History Main Topics    Smoking status: Former Smoker     Packs/day: 0 50     Quit date: 1/11/2018    Smokeless tobacco: Never Used      Comment: nicotine pouch 3mg 5 times per day    Alcohol use Yes      Comment: socially    Drug use: Yes     Types: Marijuana      Comment: daily    Sexual activity: Not on file     Other Topics Concern    Not on file     Social History Narrative    No narrative on file       Current Outpatient Prescriptions:     albuterol (PROVENTIL HFA,VENTOLIN HFA) 90 mcg/act inhaler, Inhale 2 puffs every 6 (six) hours as needed for wheezing, Disp: , Rfl:     amphetamine-dextroamphetamine (ADDERALL XR, 25MG,) 25 MG 24 hr capsule, Take 25 mg by mouth every morning, Disp: , Rfl:     amphetamine-dextroamphetamine (ADDERALL, 5MG,) 5 MG tablet, Take 15 mg by mouth daily, Disp: , Rfl:     carBAMazepine (TEGretol XR) 400 mg 12 hr tablet, Take 400 mg by mouth 2 (two) times a day, Disp: , Rfl: 0    carBAMazepine (TEGretol) 200 mg tablet, Take 400 mg by mouth 2 (two) times a day, Disp: , Rfl:     ergocalciferol (ERGOCALCIFEROL) 95930 units capsule, Take 50,000 Units by mouth, Disp: , Rfl:     ergocalciferol (VITAMIN D2) 50,000 units, Take 50,000 Units by mouth once a week, Disp: , Rfl: 0    oxyCODONE-acetaminophen (PERCOCET) 5-325 mg per tablet, Take 1 tablet by mouth every 6 (six) hours as needed for moderate pain Max Daily Amount: 4 tablets, Disp: 6 tablet, Rfl: 0    sertraline (ZOLOFT) 50 mg tablet, TAKE 1/2-1 TABLET BY MOUTH EVERY MORNING, Disp: , Rfl: 0  No Known Allergies    Physical Exams:     Vitals:    05/11/18 1208   BP: 98/68   Pulse: (!) 108   Resp: 18   Temp: 98 °F (36 7 °C)     Physical Exam   Pulmonary/Chest:   Examination of the right breast demonstrates minimal swelling no evidence of ecchymosis or infection  Ultrasound interrogation of this area demonstrates approximately 2-3 mm of fluid between resolving small hematoma and underlying muscle  Results:       Labs:       Discussion/Summary:   I explained to the patient that this is too small of an area to aspirate  I think he is now resolving his hematoma  We will see him back on as-needed basis

## 2018-05-11 NOTE — PROGRESS NOTES
Surgical Oncology Breast Post-Op       [address]  [facility] SURGICAL ONCOLOGY [address] [place] [address]  1990  [id number]  [address]  [facility] SURGICAL ONCOLOGY [facility] [id number]    Chief Complaint:   Frances Fish is seen for a post-operative visit of his recent right breast surgery  Oncology History:      No history exists  Assessment & Plan:   Assessment/Plan      History of Present Illness:   Patient presents after his mastectomy to drain his seroma  This has been drained 3 times  Interval History:   See above    Review of Systems:   Review of Systems    Past Medical History:     Patient Active Problem List   Diagnosis    Anxiety and depression    Asthma    Bipolar 1 disorder ([facility] [address] )    Panic attack    Gynecomastia, male    Vitamin D deficiency    Seroma of breast     Past Medical History:   Diagnosis Date    Asthma     [facility] disorder ([email or web address] )      Past Surgical History:   Procedure Laterality Date    FOOT SURGERY Right 2014    SIMPLE MASTECTOMY Right 4/17/2018    Procedure: MASTECTOMY SIMPLE: NIPPLE SPARING;  Surgeon: Tiffanie Garza MD;  [facility];  Service: Surgical Oncology    WISDOM TOOTH EXTRACTION       Family History   Problem Relation Age of Onset    Breast cancer Mother     Breast cancer Cousin      Social History     Social History    Marital status: /Civil Union     Spouse name: N/A    Number of children: N/A    Years of education: N/A     Occupational History    Not on file       Social History Main Topics    Smoking status: Former Smoker     Packs/day: 0 50     Quit date: 1/11/2018    Smokeless tobacco: Never Used      Comment: nicotine pouch 3mg 5 times per day    Alcohol use Yes      Comment: socially    Drug use: Yes     Types: Marijuana      Comment: daily    Sexual activity: Not on file     Other Topics Concern    Not on file Social History Narrative    No narrative on file       Current Outpatient Prescriptions:     albuterol (PROVENTIL HFA,VENTOLIN HFA) 90 mcg/act inhaler, Inhale 2 puffs every 6 (six) hours as needed for wheezing, Disp: , Rfl:     amphetamine-dextroamphetamine (ADDERALL XR, 25MG,) 25 MG 24 hr capsule, Take 25 mg by mouth every morning, Disp: , Rfl:     amphetamine-dextroamphetamine (ADDERALL, 5MG,) 5 MG tablet, Take 15 mg by mouth daily, Disp: , Rfl:     carBAMazepine (TEGretol XR) 400 mg 12 hr tablet, Take 400 mg by mouth 2 (two) times a day, Disp: , Rfl: 0    carBAMazepine (TEGretol) 200 mg tablet, Take 400 mg by mouth 2 (two) times a day, Disp: , Rfl:     ergocalciferol (ERGOCALCIFEROL) 76090 units capsule, Take 50,000 Units by mouth, Disp: , Rfl:     ergocalciferol (VITAMIN D2) 50,000 units, Take 50,000 Units by mouth once a week, Disp: , Rfl: 0    oxyCODONE-acetaminophen (PERCOCET) 5-325 mg per tablet, Take 1 tablet by mouth every 6 (six) hours as needed for moderate pain Max Daily Amount: 4 tablets, Disp: 6 tablet, Rfl: 0    sertraline (ZOLOFT) 50 mg tablet, TAKE 1/2-1 TABLET BY MOUTH EVERY MORNING, Disp: , Rfl: 0  No Known Allergies    Physical Exams:     Vitals:    05/11/18 1208   BP: 98/68   Pulse: (!) 108   Resp: 18   Temp: 98 °F (36 7 °C)     Physical Exam   Pulmonary/Chest:   Examination of the right breast demonstrates minimal swelling no evidence of ecchymosis or infection  Ultrasound interrogation of this area demonstrates approximately 2-3 mm of fluid between resolving small hematoma and underlying muscle  Results:       Labs:       Discussion/Summary:   I explained to the patient that this is too small of an area to aspirate  I think he is now resolving his hematoma  We will see him back on as-needed basis

## 2019-10-06 ENCOUNTER — HOSPITAL ENCOUNTER (EMERGENCY)
Facility: HOSPITAL | Age: 29
Discharge: HOME | End: 2019-10-06
Attending: EMERGENCY MEDICINE
Payer: COMMERCIAL

## 2019-10-06 VITALS
RESPIRATION RATE: 18 BRPM | BODY MASS INDEX: 20.89 KG/M2 | WEIGHT: 162.7 LBS | OXYGEN SATURATION: 100 % | DIASTOLIC BLOOD PRESSURE: 85 MMHG | SYSTOLIC BLOOD PRESSURE: 144 MMHG | HEART RATE: 102 BPM

## 2019-10-06 DIAGNOSIS — S09.93XA DENTAL INJURY, INITIAL ENCOUNTER: Primary | ICD-10-CM

## 2019-10-06 DIAGNOSIS — S02.5XXA CLOSED FRACTURE OF TOOTH, INITIAL ENCOUNTER: ICD-10-CM

## 2019-10-06 PROCEDURE — 99284 EMERGENCY DEPT VISIT MOD MDM: CPT | Performed by: EMERGENCY MEDICINE

## 2019-10-06 PROCEDURE — 99283 EMERGENCY DEPT VISIT LOW MDM: CPT

## 2019-10-06 RX ORDER — MORPHINE SULFATE 15 MG/1
15 TABLET ORAL EVERY 4 HOURS PRN
Qty: 5 TABLET | Refills: 0 | Status: SHIPPED | OUTPATIENT
Start: 2019-10-06 | End: 2019-10-16

## 2019-10-07 NOTE — ED PROVIDER NOTES
History  Chief Complaint   Patient presents with   [address] Injury     Patient comes to ED stating that he was chewing a hard piece of candy and broke tooth on L upper side  Patient c/o 8/10 pain  Took ibuprofen and tylenol prior to arrival        History provided by:  Patient  Dental Injury   Location:  Left upper molar  Quality:  Dull/throbbing  Severity:  Moderate  Onset quality:  Sudden  Duration:  1 hour  Timing:  Constant  Progression:  Worsening  Chronicity:  New  Context:  Patient was at work, took a bite of hard candy, felt my tooth shatter  This is tooth where he has had multiple fillings already  Relieved by:  Nothing  Worsened by:  None tried  Associated symptoms: no chest pain, no fever, no headaches, no rash and no shortness of breath        Prior to Admission Medications   Prescriptions Last Dose Informant Patient Reported? Taking?    Albuterol Sulfate (PROAIR HFA IN)   Yes Yes   Sig: inhale 2 puff by inhalation route  every 6 hours as needed   albuterol (PROVENTIL HFA,VENTOLIN HFA) 90 mcg/act inhaler  Self Yes No   Sig: Inhale 2 puffs every 6 (six) hours as needed for wheezing   amphetamine-dextroamphetamine (ADDERALL XR, 25MG,) 25 MG 24 hr capsule 10/6/2019 at Unknown time Self Yes Yes   Sig: Take 25 mg by mouth every morning   amphetamine-dextroamphetamine (ADDERALL, 10MG,) 10 mg tablet 10/6/2019 at Unknown time Self Yes Yes   Sig: Take 10 mg by mouth daily    carBAMazepine (TEGretol XR) 400 mg 12 hr tablet  Self Yes No   Sig: Take 400 mg by mouth 2 (two) times a day   ergocalciferol (VITAMIN D2) 50,000 units 10/6/2019 at Unknown time Self Yes Yes   Sig: Take 50,000 Units by mouth once a week   sertraline (ZOLOFT) 50 mg tablet  Self Yes No   Sig: TAKE 1/2-1 TABLET BY MOUTH EVERY MORNING      Facility-Administered Medications: None       Past Medical History:   Diagnosis Date    Asthma     Bipolar disorder ([facility] [address] )        Past Surgical History:   Procedure Laterality Date    FOOT SURGERY Right     SIMPLE MASTECTOMY Right 2018    Procedure: MASTECTOMY SIMPLE: NIPPLE SPARING;  Surgeon: Lisa Rosado MD;  [facility];  Service: Surgical Oncology    WISDOM TOOTH EXTRACTION         Family History   Problem Relation Age of Onset    Breast cancer Mother     Breast cancer Cousin      I have reviewed and agree with the history as documented  Social History     Tobacco Use    Smoking status: Former Smoker     Packs/day: 0 50     Last attempt to quit: 2018     Years since quittin 7    Smokeless tobacco: Current User    Tobacco comment: nicotine pouch 3mg 5 times per day   Substance Use Topics    Alcohol use: Yes     Comment: socially    Drug use: Yes     Types: Marijuana     Comment: daily        Review of Systems   Constitutional: Negative for fever  Respiratory: Negative for chest tightness and shortness of breath  Cardiovascular: Negative for chest pain  Skin: Negative for rash  Neurological: Negative for dizziness, light-headedness and headaches  Physical Exam  Physical Exam   Constitutional: He appears well-developed  HENT:   Head: Atraumatic  Generalized poor dentition, left upper molar, area of broken tooth consistent with acute fracture, this is immediately adjacent to an area with previous silver filling   Eyes: Conjunctivae are normal  Right eye exhibits no discharge  Left eye exhibits no discharge  No scleral icterus  Neck: Neck supple  No tracheal deviation present  Pulmonary/Chest: Effort normal  No stridor  No respiratory distress  Musculoskeletal: He exhibits no deformity  Neurological: He is alert  He exhibits normal muscle tone  Coordination normal    Skin: Skin is warm and dry  No rash noted  No erythema  No pallor  Psychiatric: He has a normal mood and affect  Vitals reviewed        Vital Signs  ED Triage Vitals [10/06/19 2217]   Temp Pulse Respirations Blood Pressure SpO2   -- 102 18 144/85 100 %      Temp src Heart Rate Source Patient Position - Orthostatic VS BP Location FiO2 (%)   -- Monitor Sitting Right arm --      Pain Score       8           Vitals:    10/06/19 2217   BP: 144/85   Pulse: 102   Patient Position - Orthostatic VS: Sitting         Visual Acuity      ED Medications  Medications - No data to display    Diagnostic Studies  Results Reviewed     None                 No orders to display              Procedures  Procedures       ED Course                               MDM  Number of Diagnoses or Management Options  Closed fracture of tooth, initial encounter: new and requires workup  Dental injury, initial encounter: new and requires workup  Diagnosis management comments:       Initial ED assessment:  73-year-old male presents with dental pain after biting a piece of candy and fracture in tooth  , will prescribe 5 pills morphine agrees to get with his dentist tomorrow         Amount and/or Complexity of Data Reviewed  Decide to obtain previous medical records or to obtain history from someone other than the patient: yes (Prescription drug monitoring program)  Review and summarize past medical records: yes        Disposition  Final diagnoses:   Dental injury, initial encounter   Closed fracture of tooth, initial encounter     Time reflects when diagnosis was documented in both MDM as applicable and the Disposition within this note     Time User Action Codes Description Comment    10/6/2019 10:26 PM [address] [place] [address] Dental injury, initial encounter     10/6/2019 10:26 PM Hari Chase Add [S02  5XXA] Closed fracture of tooth, initial encounter       ED Disposition     ED Disposition Condition Date/Time Comment    Discharge Stable Sun Oct 6, 2019 10:26 PM [facility] discharge to home/self care              Follow-up Information    None         Patient's Medications   Discharge Prescriptions    MORPHINE (MSIR) 15 MG TABLET    Take 1 tablet (15 mg total) by mouth every 4 (four) hours as needed for severe pain for up to 10 daysMax Daily Amount: 90 mg       Start Date: 10/6/2019 End Date: 10/16/2019       Order Dose: 15 mg       Quantity: 5 tablet    Refills: 0     No discharge procedures on file      ED Provider  Electronically Signed by           Kim Ruiz DO  10/06/19 2874

## 2020-07-24 ENCOUNTER — OFFICE VISIT (OUTPATIENT)
Dept: URGENT CARE | Facility: CLINIC | Age: 30
End: 2020-07-24
Payer: COMMERCIAL

## 2020-07-24 VITALS
HEART RATE: 73 BPM | WEIGHT: 160 LBS | SYSTOLIC BLOOD PRESSURE: 135 MMHG | HEIGHT: 74 IN | OXYGEN SATURATION: 100 % | DIASTOLIC BLOOD PRESSURE: 75 MMHG | BODY MASS INDEX: 20.53 KG/M2 | TEMPERATURE: 98.1 F | RESPIRATION RATE: 16 BRPM

## 2020-07-24 DIAGNOSIS — R19.7 NAUSEA VOMITING AND DIARRHEA: Primary | ICD-10-CM

## 2020-07-24 DIAGNOSIS — R11.2 NAUSEA VOMITING AND DIARRHEA: Primary | ICD-10-CM

## 2020-07-24 PROCEDURE — 99213 OFFICE O/P EST LOW 20 MIN: CPT | Performed by: PREVENTIVE MEDICINE

## 2020-07-24 RX ORDER — ONDANSETRON 4 MG/1
4 TABLET, FILM COATED ORAL EVERY 8 HOURS PRN
Qty: 20 TABLET | Refills: 0 | Status: SHIPPED | OUTPATIENT
Start: 2020-07-24 | End: 2020-11-01 | Stop reason: HOSPADM

## 2020-07-24 NOTE — LETTER
July 24, 2020     Patient: Kayce Lopez   YOB: 1990   Date of Visit: 7/24/2020       To Whom It May Concern: It is my medical opinion that Kayce Lopez may return to work on 7/26/2020  Please excuse him for work on 7/24/2020  If you have any questions or concerns, please don't hesitate to call           Sincerely,        Endy Rogers MD    CC: No Recipients

## 2020-07-24 NOTE — PATIENT INSTRUCTIONS
Gastroenteritis   AMBULATORY CARE:   Gastroenteritis , or stomach flu, is an infection of the stomach and intestines  It is caused by bacteria, parasites, or viruses  Common symptoms include the following:   · Diarrhea or gas    · Nausea, vomiting, or poor appetite    · Abdominal cramps, pain, or gurgling    · Fever    · Tiredness or weakness    · Headaches or muscle aches with any of the above symptoms  Call 911 for any of the following:   · You have trouble breathing or a very fast pulse  Seek care immediately if:   · You see blood in your diarrhea  · You cannot stop vomiting  · You have not urinated for 12 hours  · You feel like you are going to faint  Contact your healthcare provider if:   · You have a fever  · You continue to vomit or have diarrhea, even after treatment  · You see worms in your diarrhea  · Your mouth or eyes are dry  You are not urinating as much or as often  · You have questions or concerns about your condition or care  Treatment for gastroenteritis  may include medicines to slow or stop your diarrhea or vomiting  You may also need medicines to treat an infection caused by bacteria or a parasite  Manage your symptoms:   · Drink liquids as directed  Ask your healthcare provider how much liquid to drink each day, and which liquids are best for you  You may also need to drink an oral rehydration solution (ORS)  An ORS has the right amounts of sugar, salt, and minerals in water to replace body fluids  · Eat bland foods  When you feel hungry, begin eating soft, bland foods  Examples are bananas, clear soup, potatoes, and applesauce  Do not have dairy products, alcohol, sugary drinks, or drinks with caffeine until you feel better  · Rest as much as possible  Slowly start to do more each day when you begin to feel better  Prevent the spread of germs:  Gastroenteritis can spread easily   Keep yourself, your family, and your surroundings clean to help prevent the spread of gastroenteritis:  · Wash your hands often  Use soap and water  Wash your hands after you use the bathroom, change a child's diapers, or sneeze  Wash your hands before you prepare or eat food  · Clean surfaces and do laundry often  Wash your clothes and towels separately from the rest of the laundry  Clean surfaces in your home with antibacterial  or bleach  · Clean food thoroughly and cook safely  Wash raw vegetables before you cook  Cook meat, fish, and eggs fully  Do not use the same dishes for raw meat as you do for other foods  Refrigerate any leftover food immediately  · Be aware when you camp or travel  Drink only clean water  Do not drink from rivers or lakes unless you purify or boil the water first  When you travel, drink bottled water and do not add ice  Do not eat fruit that has not been peeled  Do not eat raw fish or meat that is not fully cooked  Follow up with your healthcare provider as directed:  Write down your questions so you remember to ask them during your visits  © 2017 [facility] Information is for End User's use only and may not be sold, redistributed or otherwise used for commercial purposes  All illustrations and images included in CareNotes® are the copyrighted property of A D A M , Inc  or Surendra Perez  The above information is an  only  It is not intended as medical advice for individual conditions or treatments  Talk to your doctor, nurse or pharmacist before following any medical regimen to see if it is safe and effective for you

## 2020-07-29 NOTE — PROGRESS NOTES
[address] [facility]        NAME: Nikky Hightower is a 30 y o  male  : 1990    MRN: [id number]  DATE: 2020  TIME: 9:19 AM    Assessment and Plan   Nausea vomiting and diarrhea [R11 2, R19 7]  1  Nausea vomiting and diarrhea  ondansetron (ZOFRAN) 4 mg tablet         Patient Instructions       Follow up with PCP in 3-5 days  Proceed to  ER if symptoms worsen  Chief Complaint     Chief Complaint   Patient presents with    Nausea     onset of symptoms 2 days ago, worsened today had to call off work, fatigue, slight cramping in stomach         History of Present Illness       Nausea   This is a new problem  The current episode started in the past 7 days  The problem occurs intermittently  The problem has been gradually worsening  Associated symptoms include abdominal pain and nausea  Pertinent negatives include no fever  Associated symptoms comments: Diarrhea    Nothing aggravates the symptoms  He has tried nothing for the symptoms  The treatment provided no relief  Review of Systems   Review of Systems   Constitutional: Negative  Negative for fever  Respiratory: Negative  Cardiovascular: Negative  Gastrointestinal: Positive for abdominal pain, diarrhea and nausea  All other systems reviewed and are negative          Current Medications       Current Outpatient Medications:     albuterol (PROVENTIL HFA,VENTOLIN HFA) 90 mcg/act inhaler, Inhale 2 puffs every 6 (six) hours as needed for wheezing, Disp: , Rfl:     Albuterol Sulfate (PROAIR HFA IN), inhale 2 puff by inhalation route  every 6 hours as needed, Disp: , Rfl:     amphetamine-dextroamphetamine (ADDERALL XR, 25MG,) 25 MG 24 hr capsule, Take 25 mg by mouth every morning, Disp: , Rfl:     amphetamine-dextroamphetamine (ADDERALL, 10MG,) 10 mg tablet, Take 10 mg by mouth daily , Disp: , Rfl:     carBAMazepine (TEGretol XR) 400 mg 12 hr tablet, Take 400 mg by mouth 2 (two) times a day, Disp: , Rfl: 0    ergocalciferol (VITAMIN D2) 50,000 units, Take 50,000 Units by mouth once a week, Disp: , Rfl: 0    ondansetron (ZOFRAN) 4 mg tablet, Take 1 tablet (4 mg total) by mouth every 8 (eight) hours as needed for nausea or vomiting, Disp: 20 tablet, Rfl: 0    sertraline (ZOLOFT) 50 mg tablet, TAKE 1/2-1 TABLET BY MOUTH EVERY MORNING, Disp: , Rfl: 0    Current Allergies     Allergies as of 07/24/2020    (No Known Allergies)            The following portions of the patient's history were reviewed and updated as appropriate: allergies, current medications, past family history, past medical history, past social history, past surgical history and problem list      Past Medical History:   Diagnosis Date    Asthma     B[facility] disorder ([facility] [address] )        Past Surgical History:   Procedure Laterality Date    FOOT SURGERY Right 2014    SIMPLE MASTECTOMY Right 4/17/2018    Procedure: MASTECTOMY SIMPLE: NIPPLE SPARING;  Surgeon: José Johnson MD;  [facility];  Service: Surgical Oncology    WISDOM TOOTH EXTRACTION         Family History   Problem Relation Age of Onset    Breast cancer Mother     Breast cancer Cousin          Medications have been verified  Objective   /75   Pulse 73   Temp 98 1 °F (36 7 °C)   Resp 16   Ht 6' 1 5" (1 867 m)   Wt 72 6 kg (160 lb)   SpO2 100%   BMI 20 82 kg/m²        Physical Exam     Physical Exam   Constitutional: He appears well-developed and well-nourished  Cardiovascular: Normal rate and regular rhythm  Pulmonary/Chest: Effort normal and breath sounds normal    Abdominal: Soft  Bowel sounds are normal  There is tenderness in the epigastric area  There is no rebound and no guarding  Nursing note and vitals reviewed

## 2020-10-31 ENCOUNTER — APPOINTMENT (OUTPATIENT)
Dept: CT IMAGING | Facility: HOSPITAL | Age: 30
End: 2020-10-31
Payer: COMMERCIAL

## 2020-10-31 ENCOUNTER — HOSPITAL ENCOUNTER (OUTPATIENT)
Facility: HOSPITAL | Age: 30
Setting detail: OBSERVATION
Discharge: HOME | End: 2020-11-01
Attending: EMERGENCY MEDICINE | Admitting: INTERNAL MEDICINE
Payer: COMMERCIAL

## 2020-10-31 ENCOUNTER — APPOINTMENT (EMERGENCY)
Dept: RADIOLOGY | Facility: HOSPITAL | Age: 30
End: 2020-10-31
Payer: COMMERCIAL

## 2020-10-31 ENCOUNTER — APPOINTMENT (EMERGENCY)
Dept: CT IMAGING | Facility: HOSPITAL | Age: 30
End: 2020-10-31
Payer: COMMERCIAL

## 2020-10-31 DIAGNOSIS — R20.2 NUMBNESS AND TINGLING: ICD-10-CM

## 2020-10-31 DIAGNOSIS — R20.0 NUMBNESS AND TINGLING: ICD-10-CM

## 2020-10-31 DIAGNOSIS — R55 SYNCOPE: ICD-10-CM

## 2020-10-31 DIAGNOSIS — R20.0 EXTREMITY NUMBNESS: ICD-10-CM

## 2020-10-31 DIAGNOSIS — S09.90XA INJURY OF HEAD, INITIAL ENCOUNTER: ICD-10-CM

## 2020-10-31 DIAGNOSIS — R77.8 ELEVATED TROPONIN: Primary | ICD-10-CM

## 2020-10-31 PROBLEM — D72.829 LEUKOCYTOSIS: Status: ACTIVE | Noted: 2020-10-31

## 2020-10-31 PROBLEM — R79.89 ELEVATED TROPONIN: Status: ACTIVE | Noted: 2020-10-31

## 2020-10-31 PROBLEM — R07.89 CHEST WALL PAIN: Status: ACTIVE | Noted: 2020-10-31

## 2020-10-31 PROBLEM — R11.10 VOMITING: Status: ACTIVE | Noted: 2020-10-31

## 2020-10-31 LAB
ALBUMIN SERPL BCP-MCNC: 4.2 G/DL (ref 3.5–5)
ALP SERPL-CCNC: 83 U/L (ref 46–116)
ALT SERPL W P-5'-P-CCNC: 20 U/L (ref 12–78)
AMPHETAMINES SERPL QL SCN: NEGATIVE
ANION GAP SERPL CALCULATED.3IONS-SCNC: 10 MMOL/L (ref 4–13)
AST SERPL W P-5'-P-CCNC: 32 U/L (ref 5–45)
BACTERIA UR QL AUTO: ABNORMAL /HPF
BARBITURATES UR QL: NEGATIVE
BASOPHILS # BLD AUTO: 0.05 THOUSANDS/ΜL (ref 0–0.1)
BASOPHILS NFR BLD AUTO: 0 % (ref 0–1)
BENZODIAZ UR QL: NEGATIVE
BILIRUB SERPL-MCNC: 0.58 MG/DL (ref 0.2–1)
BILIRUB UR QL STRIP: NEGATIVE
BILIRUB UR QL STRIP: NEGATIVE
BUN SERPL-MCNC: 13 MG/DL (ref 5–25)
CALCIUM SERPL-MCNC: 9 MG/DL (ref 8.3–10.1)
CHLORIDE SERPL-SCNC: 102 MMOL/L (ref 100–108)
CK MB SERPL-MCNC: 11.8 NG/ML (ref 0–5)
CK MB SERPL-MCNC: 2.6 % (ref 0–2.5)
CK SERPL-CCNC: 455 U/L (ref 39–308)
CLARITY UR: CLEAR
CLARITY UR: CLEAR
CO2 SERPL-SCNC: 28 MMOL/L (ref 21–32)
COCAINE UR QL: NEGATIVE
COLOR UR: YELLOW
COLOR UR: YELLOW
CREAT SERPL-MCNC: 1.17 MG/DL (ref 0.6–1.3)
EOSINOPHIL # BLD AUTO: 0.01 THOUSAND/ΜL (ref 0–0.61)
EOSINOPHIL NFR BLD AUTO: 0 % (ref 0–6)
ERYTHROCYTE [DISTWIDTH] IN BLOOD BY AUTOMATED COUNT: 11.7 % (ref 11.6–15.1)
GFR SERPL CREATININE-BSD FRML MDRD: 84 ML/MIN/1.73SQ M
GLUCOSE SERPL-MCNC: 105 MG/DL (ref 65–140)
GLUCOSE UR STRIP-MCNC: NEGATIVE MG/DL
GLUCOSE UR STRIP-MCNC: NEGATIVE MG/DL
HCT VFR BLD AUTO: 44.7 % (ref 36.5–49.3)
HGB BLD-MCNC: 15.2 G/DL (ref 12–17)
HGB UR QL STRIP.AUTO: NEGATIVE
HGB UR QL STRIP.AUTO: NEGATIVE
IMM GRANULOCYTES # BLD AUTO: 0.23 THOUSAND/UL (ref 0–0.2)
IMM GRANULOCYTES NFR BLD AUTO: 1 % (ref 0–2)
KETONES UR STRIP-MCNC: ABNORMAL MG/DL
KETONES UR STRIP-MCNC: ABNORMAL MG/DL
LEUKOCYTE ESTERASE UR QL STRIP: NEGATIVE
LEUKOCYTE ESTERASE UR QL STRIP: NEGATIVE
LYMPHOCYTES # BLD AUTO: 0.59 THOUSANDS/ΜL (ref 0.6–4.47)
LYMPHOCYTES NFR BLD AUTO: 2 % (ref 14–44)
MCH RBC QN AUTO: 32 PG (ref 26.8–34.3)
MCHC RBC AUTO-ENTMCNC: 34 G/DL (ref 31.4–37.4)
MCV RBC AUTO: 94 FL (ref 82–98)
METHADONE UR QL: NEGATIVE
MONOCYTES # BLD AUTO: 2.26 THOUSAND/ΜL (ref 0.17–1.22)
MONOCYTES NFR BLD AUTO: 9 % (ref 4–12)
NEUTROPHILS # BLD AUTO: 23.3 THOUSANDS/ΜL (ref 1.85–7.62)
NEUTS SEG NFR BLD AUTO: 88 % (ref 43–75)
NITRITE UR QL STRIP: NEGATIVE
NITRITE UR QL STRIP: NEGATIVE
NON-SQ EPI CELLS URNS QL MICRO: ABNORMAL /HPF
NRBC BLD AUTO-RTO: 0 /100 WBCS
OPIATES UR QL SCN: NEGATIVE
OXYCODONE+OXYMORPHONE UR QL SCN: NEGATIVE
PCP UR QL: NEGATIVE
PH UR STRIP.AUTO: 5.5 [PH]
PH UR STRIP.AUTO: 6.5 [PH]
PLATELET # BLD AUTO: 225 THOUSANDS/UL (ref 149–390)
PMV BLD AUTO: 10.1 FL (ref 8.9–12.7)
POTASSIUM SERPL-SCNC: 4.1 MMOL/L (ref 3.5–5.3)
PROT SERPL-MCNC: 7.2 G/DL (ref 6.4–8.2)
PROT UR STRIP-MCNC: NEGATIVE MG/DL
PROT UR STRIP-MCNC: NEGATIVE MG/DL
RBC # BLD AUTO: 4.75 MILLION/UL (ref 3.88–5.62)
RBC #/AREA URNS AUTO: ABNORMAL /HPF
SODIUM SERPL-SCNC: 140 MMOL/L (ref 136–145)
SP GR UR STRIP.AUTO: 1.02 (ref 1–1.03)
SP GR UR STRIP.AUTO: 1.02 (ref 1–1.03)
THC UR QL: POSITIVE
TROPONIN I SERPL-MCNC: 0.22 NG/ML
TROPONIN I SERPL-MCNC: 0.27 NG/ML
TROPONIN I SERPL-MCNC: 0.35 NG/ML
TROPONIN I SERPL-MCNC: 0.37 NG/ML
UROBILINOGEN UR QL STRIP.AUTO: 0.2 E.U./DL
UROBILINOGEN UR QL STRIP.AUTO: 0.2 E.U./DL
WBC # BLD AUTO: 26.44 THOUSAND/UL (ref 4.31–10.16)
WBC #/AREA URNS AUTO: ABNORMAL /HPF

## 2020-10-31 PROCEDURE — 36415 COLL VENOUS BLD VENIPUNCTURE: CPT | Performed by: EMERGENCY MEDICINE

## 2020-10-31 PROCEDURE — 70450 CT HEAD/BRAIN W/O DYE: CPT

## 2020-10-31 PROCEDURE — 84484 ASSAY OF TROPONIN QUANT: CPT | Performed by: EMERGENCY MEDICINE

## 2020-10-31 PROCEDURE — 93005 ELECTROCARDIOGRAM TRACING: CPT

## 2020-10-31 PROCEDURE — 99285 EMERGENCY DEPT VISIT HI MDM: CPT

## 2020-10-31 PROCEDURE — 96360 HYDRATION IV INFUSION INIT: CPT

## 2020-10-31 PROCEDURE — 71045 X-RAY EXAM CHEST 1 VIEW: CPT

## 2020-10-31 PROCEDURE — 81001 URINALYSIS AUTO W/SCOPE: CPT | Performed by: INTERNAL MEDICINE

## 2020-10-31 PROCEDURE — 71260 CT THORAX DX C+: CPT

## 2020-10-31 PROCEDURE — G1004 CDSM NDSC: HCPCS

## 2020-10-31 PROCEDURE — 99285 EMERGENCY DEPT VISIT HI MDM: CPT | Performed by: EMERGENCY MEDICINE

## 2020-10-31 PROCEDURE — 99220 PR INITIAL OBSERVATION CARE/DAY 70 MINUTES: CPT | Performed by: INTERNAL MEDICINE

## 2020-10-31 PROCEDURE — 74177 CT ABD & PELVIS W/CONTRAST: CPT

## 2020-10-31 PROCEDURE — 82553 CREATINE MB FRACTION: CPT | Performed by: INTERNAL MEDICINE

## 2020-10-31 PROCEDURE — 96361 HYDRATE IV INFUSION ADD-ON: CPT

## 2020-10-31 PROCEDURE — 85025 COMPLETE CBC W/AUTO DIFF WBC: CPT | Performed by: EMERGENCY MEDICINE

## 2020-10-31 PROCEDURE — 80307 DRUG TEST PRSMV CHEM ANLYZR: CPT | Performed by: EMERGENCY MEDICINE

## 2020-10-31 PROCEDURE — 82550 ASSAY OF CK (CPK): CPT | Performed by: INTERNAL MEDICINE

## 2020-10-31 PROCEDURE — 81003 URINALYSIS AUTO W/O SCOPE: CPT | Performed by: EMERGENCY MEDICINE

## 2020-10-31 PROCEDURE — 80053 COMPREHEN METABOLIC PANEL: CPT | Performed by: EMERGENCY MEDICINE

## 2020-10-31 RX ORDER — KETOROLAC TROMETHAMINE 30 MG/ML
15 INJECTION, SOLUTION INTRAMUSCULAR; INTRAVENOUS EVERY 6 HOURS PRN
Status: DISCONTINUED | OUTPATIENT
Start: 2020-10-31 | End: 2020-11-01 | Stop reason: HOSPADM

## 2020-10-31 RX ORDER — SODIUM CHLORIDE 9 MG/ML
125 INJECTION, SOLUTION INTRAVENOUS CONTINUOUS
Status: DISCONTINUED | OUTPATIENT
Start: 2020-10-31 | End: 2020-11-01 | Stop reason: HOSPADM

## 2020-10-31 RX ORDER — KETOROLAC TROMETHAMINE 30 MG/ML
15 INJECTION, SOLUTION INTRAMUSCULAR; INTRAVENOUS ONCE
Status: COMPLETED | OUTPATIENT
Start: 2020-10-31 | End: 2020-10-31

## 2020-10-31 RX ORDER — ACETAMINOPHEN 325 MG/1
650 TABLET ORAL EVERY 6 HOURS PRN
Status: DISCONTINUED | OUTPATIENT
Start: 2020-10-31 | End: 2020-10-31

## 2020-10-31 RX ORDER — ONDANSETRON 2 MG/ML
4 INJECTION INTRAMUSCULAR; INTRAVENOUS EVERY 6 HOURS PRN
Status: DISCONTINUED | OUTPATIENT
Start: 2020-10-31 | End: 2020-11-01 | Stop reason: HOSPADM

## 2020-10-31 RX ORDER — ASPIRIN 325 MG
325 TABLET ORAL ONCE
Status: COMPLETED | OUTPATIENT
Start: 2020-10-31 | End: 2020-10-31

## 2020-10-31 RX ORDER — ALBUTEROL SULFATE 90 UG/1
2 AEROSOL, METERED RESPIRATORY (INHALATION) EVERY 6 HOURS PRN
Status: DISCONTINUED | OUTPATIENT
Start: 2020-10-31 | End: 2020-11-01 | Stop reason: HOSPADM

## 2020-10-31 RX ORDER — TRAMADOL HYDROCHLORIDE 50 MG/1
50 TABLET ORAL EVERY 6 HOURS PRN
Status: DISCONTINUED | OUTPATIENT
Start: 2020-10-31 | End: 2020-11-01 | Stop reason: HOSPADM

## 2020-10-31 RX ORDER — ACETAMINOPHEN 325 MG/1
650 TABLET ORAL EVERY 6 HOURS PRN
Status: DISCONTINUED | OUTPATIENT
Start: 2020-10-31 | End: 2020-11-01 | Stop reason: HOSPADM

## 2020-10-31 RX ADMIN — DICLOFENAC SODIUM 2 G: 10 GEL TOPICAL at 21:45

## 2020-10-31 RX ADMIN — ACETAMINOPHEN 650 MG: 325 TABLET, FILM COATED ORAL at 14:51

## 2020-10-31 RX ADMIN — SODIUM CHLORIDE 1000 ML: 0.9 INJECTION, SOLUTION INTRAVENOUS at 10:37

## 2020-10-31 RX ADMIN — ASPIRIN 325 MG ORAL TABLET 325 MG: 325 PILL ORAL at 12:30

## 2020-10-31 RX ADMIN — SODIUM CHLORIDE 100 ML/HR: 0.9 INJECTION, SOLUTION INTRAVENOUS at 15:45

## 2020-10-31 RX ADMIN — TRAMADOL HYDROCHLORIDE 50 MG: 50 TABLET, FILM COATED ORAL at 17:34

## 2020-10-31 RX ADMIN — DICLOFENAC SODIUM 2 G: 10 GEL TOPICAL at 17:35

## 2020-10-31 RX ADMIN — KETOROLAC TROMETHAMINE 15 MG: 30 INJECTION, SOLUTION INTRAMUSCULAR at 15:40

## 2020-10-31 RX ADMIN — IOHEXOL 100 ML: 350 INJECTION, SOLUTION INTRAVENOUS at 16:30

## 2020-10-31 RX ADMIN — KETOROLAC TROMETHAMINE 15 MG: 30 INJECTION, SOLUTION INTRAMUSCULAR at 21:45

## 2020-11-01 ENCOUNTER — APPOINTMENT (OUTPATIENT)
Dept: NON INVASIVE DIAGNOSTICS | Facility: HOSPITAL | Age: 30
End: 2020-11-01
Payer: COMMERCIAL

## 2020-11-01 VITALS
BODY MASS INDEX: 21.02 KG/M2 | DIASTOLIC BLOOD PRESSURE: 64 MMHG | HEIGHT: 74 IN | RESPIRATION RATE: 19 BRPM | HEART RATE: 71 BPM | SYSTOLIC BLOOD PRESSURE: 118 MMHG | OXYGEN SATURATION: 97 % | TEMPERATURE: 97.6 F | WEIGHT: 163.8 LBS

## 2020-11-01 DIAGNOSIS — R55 SYNCOPE, UNSPECIFIED SYNCOPE TYPE: Primary | ICD-10-CM

## 2020-11-01 PROBLEM — R20.0 EXTREMITY NUMBNESS: Status: ACTIVE | Noted: 2020-11-01

## 2020-11-01 LAB
ALBUMIN SERPL BCP-MCNC: 3.2 G/DL (ref 3.5–5)
ALP SERPL-CCNC: 55 U/L (ref 46–116)
ALT SERPL W P-5'-P-CCNC: 21 U/L (ref 12–78)
ANION GAP SERPL CALCULATED.3IONS-SCNC: 8 MMOL/L (ref 4–13)
AST SERPL W P-5'-P-CCNC: 26 U/L (ref 5–45)
ATRIAL RATE: 89 BPM
ATRIAL RATE: 99 BPM
BILIRUB SERPL-MCNC: 0.49 MG/DL (ref 0.2–1)
BUN SERPL-MCNC: 9 MG/DL (ref 5–25)
CALCIUM ALBUM COR SERPL-MCNC: 9 MG/DL (ref 8.3–10.1)
CALCIUM SERPL-MCNC: 8.4 MG/DL (ref 8.3–10.1)
CHLORIDE SERPL-SCNC: 107 MMOL/L (ref 100–108)
CK MB SERPL-MCNC: 1.6 % (ref 0–2.5)
CK MB SERPL-MCNC: 7.5 NG/ML (ref 0–5)
CK SERPL-CCNC: 433 U/L (ref 39–308)
CO2 SERPL-SCNC: 26 MMOL/L (ref 21–32)
CREAT SERPL-MCNC: 0.91 MG/DL (ref 0.6–1.3)
ERYTHROCYTE [DISTWIDTH] IN BLOOD BY AUTOMATED COUNT: 11.9 % (ref 11.6–15.1)
GFR SERPL CREATININE-BSD FRML MDRD: 113 ML/MIN/1.73SQ M
GLUCOSE P FAST SERPL-MCNC: 94 MG/DL (ref 65–99)
GLUCOSE SERPL-MCNC: 94 MG/DL (ref 65–140)
HCT VFR BLD AUTO: 37.2 % (ref 36.5–49.3)
HGB BLD-MCNC: 12.7 G/DL (ref 12–17)
MAGNESIUM SERPL-MCNC: 1.9 MG/DL (ref 1.6–2.6)
MCH RBC QN AUTO: 32.3 PG (ref 26.8–34.3)
MCHC RBC AUTO-ENTMCNC: 34.1 G/DL (ref 31.4–37.4)
MCV RBC AUTO: 95 FL (ref 82–98)
P AXIS: 67 DEGREES
P AXIS: 70 DEGREES
PLATELET # BLD AUTO: 176 THOUSANDS/UL (ref 149–390)
PMV BLD AUTO: 10.3 FL (ref 8.9–12.7)
POTASSIUM SERPL-SCNC: 3.5 MMOL/L (ref 3.5–5.3)
PR INTERVAL: 134 MS
PR INTERVAL: 134 MS
PROT SERPL-MCNC: 5.8 G/DL (ref 6.4–8.2)
QRS AXIS: 62 DEGREES
QRS AXIS: 67 DEGREES
QRSD INTERVAL: 88 MS
QRSD INTERVAL: 92 MS
QT INTERVAL: 334 MS
QT INTERVAL: 336 MS
QTC INTERVAL: 408 MS
QTC INTERVAL: 428 MS
RBC # BLD AUTO: 3.93 MILLION/UL (ref 3.88–5.62)
SODIUM SERPL-SCNC: 141 MMOL/L (ref 136–145)
T WAVE AXIS: 49 DEGREES
T WAVE AXIS: 60 DEGREES
VENTRICULAR RATE: 89 BPM
VENTRICULAR RATE: 99 BPM
WBC # BLD AUTO: 12.43 THOUSAND/UL (ref 4.31–10.16)

## 2020-11-01 PROCEDURE — 93010 ELECTROCARDIOGRAM REPORT: CPT | Performed by: INTERNAL MEDICINE

## 2020-11-01 PROCEDURE — 99217 PR OBSERVATION CARE DISCHARGE MANAGEMENT: CPT | Performed by: INTERNAL MEDICINE

## 2020-11-01 PROCEDURE — 82550 ASSAY OF CK (CPK): CPT | Performed by: INTERNAL MEDICINE

## 2020-11-01 PROCEDURE — 99204 OFFICE O/P NEW MOD 45 MIN: CPT | Performed by: INTERNAL MEDICINE

## 2020-11-01 PROCEDURE — 82553 CREATINE MB FRACTION: CPT | Performed by: INTERNAL MEDICINE

## 2020-11-01 PROCEDURE — 93306 TTE W/DOPPLER COMPLETE: CPT | Performed by: INTERNAL MEDICINE

## 2020-11-01 PROCEDURE — 80053 COMPREHEN METABOLIC PANEL: CPT | Performed by: INTERNAL MEDICINE

## 2020-11-01 PROCEDURE — 83735 ASSAY OF MAGNESIUM: CPT | Performed by: INTERNAL MEDICINE

## 2020-11-01 PROCEDURE — 85027 COMPLETE CBC AUTOMATED: CPT | Performed by: INTERNAL MEDICINE

## 2020-11-01 PROCEDURE — 93306 TTE W/DOPPLER COMPLETE: CPT

## 2020-11-01 RX ORDER — LIDOCAINE 50 MG/G
1 PATCH TOPICAL DAILY PRN
Status: DISCONTINUED | OUTPATIENT
Start: 2020-11-01 | End: 2020-11-01 | Stop reason: HOSPADM

## 2020-11-01 RX ADMIN — SODIUM CHLORIDE 125 ML/HR: 0.9 INJECTION, SOLUTION INTRAVENOUS at 07:46

## 2020-11-01 RX ADMIN — KETOROLAC TROMETHAMINE 15 MG: 30 INJECTION, SOLUTION INTRAMUSCULAR at 04:15

## 2020-11-01 RX ADMIN — SODIUM CHLORIDE 125 ML/HR: 0.9 INJECTION, SOLUTION INTRAVENOUS at 00:37

## 2020-11-01 RX ADMIN — KETOROLAC TROMETHAMINE 15 MG: 30 INJECTION, SOLUTION INTRAMUSCULAR at 10:16

## 2020-11-01 RX ADMIN — TRAMADOL HYDROCHLORIDE 50 MG: 50 TABLET, FILM COATED ORAL at 07:46

## 2020-11-01 RX ADMIN — TRAMADOL HYDROCHLORIDE 50 MG: 50 TABLET, FILM COATED ORAL at 00:44

## 2020-11-02 ENCOUNTER — TELEPHONE (OUTPATIENT)
Dept: NEUROLOGY | Facility: CLINIC | Age: 30
End: 2020-11-02

## 2020-11-04 ENCOUNTER — TELEPHONE (OUTPATIENT)
Dept: CARDIOLOGY CLINIC | Facility: CLINIC | Age: 30
End: 2020-11-04

## 2020-11-05 ENCOUNTER — TELEPHONE (OUTPATIENT)
Dept: CARDIOLOGY CLINIC | Facility: CLINIC | Age: 30
End: 2020-11-05

## 2020-12-01 ENCOUNTER — TELEPHONE (OUTPATIENT)
Dept: OTHER | Facility: OTHER | Age: 30
End: 2020-12-01

## 2020-12-08 ENCOUNTER — CLINICAL SUPPORT (OUTPATIENT)
Dept: CARDIOLOGY CLINIC | Facility: CLINIC | Age: 30
End: 2020-12-08
Payer: COMMERCIAL

## 2020-12-08 DIAGNOSIS — R55 SYNCOPE, UNSPECIFIED SYNCOPE TYPE: ICD-10-CM

## 2020-12-08 PROCEDURE — 0298T PR EXT ECG > 48HR TO 21 DAY REVIEW AND INTERPRETATN: CPT

## 2021-11-03 ENCOUNTER — TELEPHONE (OUTPATIENT)
Dept: PSYCHIATRY | Facility: CLINIC | Age: 31
End: 2021-11-03

## 2021-12-31 ENCOUNTER — NURSE TRIAGE (OUTPATIENT)
Dept: OTHER | Facility: OTHER | Age: 31
End: 2021-12-31

## 2022-04-22 ENCOUNTER — TELEPHONE (OUTPATIENT)
Dept: PSYCHIATRY | Facility: CLINIC | Age: 32
End: 2022-04-22

## 2022-04-22 NOTE — TELEPHONE ENCOUNTER
PT called and lvm   Returned pt call and informed pt of the wait list and have added pt to wait list  suggested calling insurance company

## 2022-09-15 ENCOUNTER — APPOINTMENT (EMERGENCY)
Dept: CT IMAGING | Facility: HOSPITAL | Age: 32
End: 2022-09-15
Payer: COMMERCIAL

## 2022-09-15 ENCOUNTER — HOSPITAL ENCOUNTER (EMERGENCY)
Facility: HOSPITAL | Age: 32
Discharge: HOME | End: 2022-09-15
Attending: EMERGENCY MEDICINE
Payer: COMMERCIAL

## 2022-09-15 VITALS
HEART RATE: 79 BPM | TEMPERATURE: 97.4 F | DIASTOLIC BLOOD PRESSURE: 72 MMHG | RESPIRATION RATE: 20 BRPM | SYSTOLIC BLOOD PRESSURE: 118 MMHG | OXYGEN SATURATION: 100 %

## 2022-09-15 DIAGNOSIS — R10.12 LEFT UPPER QUADRANT ABDOMINAL PAIN: Primary | ICD-10-CM

## 2022-09-15 LAB
ALBUMIN SERPL BCP-MCNC: 4.7 G/DL (ref 3.5–5)
ALP SERPL-CCNC: 77 U/L (ref 34–104)
ALT SERPL W P-5'-P-CCNC: 7 U/L (ref 7–52)
ANION GAP SERPL CALCULATED.3IONS-SCNC: 6 MMOL/L (ref 4–13)
AST SERPL W P-5'-P-CCNC: 11 U/L (ref 13–39)
BACTERIA UR QL AUTO: ABNORMAL /HPF
BASOPHILS # BLD AUTO: 0.05 THOUSANDS/ΜL (ref 0–0.1)
BASOPHILS NFR BLD AUTO: 1 % (ref 0–1)
BILIRUB SERPL-MCNC: 0.43 MG/DL (ref 0.2–1)
BILIRUB UR QL STRIP: NEGATIVE
BUN SERPL-MCNC: 14 MG/DL (ref 5–25)
CALCIUM SERPL-MCNC: 9.5 MG/DL (ref 8.4–10.2)
CHLORIDE SERPL-SCNC: 105 MMOL/L (ref 96–108)
CLARITY UR: CLEAR
CO2 SERPL-SCNC: 27 MMOL/L (ref 21–32)
COLOR UR: YELLOW
CREAT SERPL-MCNC: 1.28 MG/DL (ref 0.6–1.3)
EOSINOPHIL # BLD AUTO: 0.39 THOUSAND/ΜL (ref 0–0.61)
EOSINOPHIL NFR BLD AUTO: 4 % (ref 0–6)
ERYTHROCYTE [DISTWIDTH] IN BLOOD BY AUTOMATED COUNT: 11.9 % (ref 11.6–15.1)
GFR SERPL CREATININE-BSD FRML MDRD: 74 ML/MIN/1.73SQ M
GLUCOSE SERPL-MCNC: 88 MG/DL (ref 65–140)
GLUCOSE UR STRIP-MCNC: NEGATIVE MG/DL
HCT VFR BLD AUTO: 46.1 % (ref 36.5–49.3)
HGB BLD-MCNC: 15.4 G/DL (ref 12–17)
HGB UR QL STRIP.AUTO: ABNORMAL
IMM GRANULOCYTES # BLD AUTO: 0.02 THOUSAND/UL (ref 0–0.2)
IMM GRANULOCYTES NFR BLD AUTO: 0 % (ref 0–2)
KETONES UR STRIP-MCNC: ABNORMAL MG/DL
LEUKOCYTE ESTERASE UR QL STRIP: NEGATIVE
LIPASE SERPL-CCNC: 41 U/L (ref 11–82)
LYMPHOCYTES # BLD AUTO: 1.72 THOUSANDS/ΜL (ref 0.6–4.47)
LYMPHOCYTES NFR BLD AUTO: 20 % (ref 14–44)
MCH RBC QN AUTO: 31.2 PG (ref 26.8–34.3)
MCHC RBC AUTO-ENTMCNC: 33.4 G/DL (ref 31.4–37.4)
MCV RBC AUTO: 93 FL (ref 82–98)
MONOCYTES # BLD AUTO: 0.82 THOUSAND/ΜL (ref 0.17–1.22)
MONOCYTES NFR BLD AUTO: 9 % (ref 4–12)
MUCOUS THREADS UR QL AUTO: ABNORMAL
NEUTROPHILS # BLD AUTO: 5.79 THOUSANDS/ΜL (ref 1.85–7.62)
NEUTS SEG NFR BLD AUTO: 66 % (ref 43–75)
NITRITE UR QL STRIP: NEGATIVE
NON-SQ EPI CELLS URNS QL MICRO: ABNORMAL /HPF
NRBC BLD AUTO-RTO: 0 /100 WBCS
PH UR STRIP.AUTO: 5.5 [PH]
PLATELET # BLD AUTO: 272 THOUSANDS/UL (ref 149–390)
PMV BLD AUTO: 10.2 FL (ref 8.9–12.7)
POTASSIUM SERPL-SCNC: 3.9 MMOL/L (ref 3.5–5.3)
PROT SERPL-MCNC: 7.4 G/DL (ref 6.4–8.4)
PROT UR STRIP-MCNC: ABNORMAL MG/DL
RBC # BLD AUTO: 4.94 MILLION/UL (ref 3.88–5.62)
RBC #/AREA URNS AUTO: ABNORMAL /HPF
SODIUM SERPL-SCNC: 138 MMOL/L (ref 135–147)
SP GR UR STRIP.AUTO: 1.03 (ref 1–1.03)
UROBILINOGEN UR STRIP-ACNC: 2 MG/DL
WBC # BLD AUTO: 8.79 THOUSAND/UL (ref 4.31–10.16)
WBC #/AREA URNS AUTO: ABNORMAL /HPF

## 2022-09-15 PROCEDURE — 96374 THER/PROPH/DIAG INJ IV PUSH: CPT

## 2022-09-15 PROCEDURE — 74177 CT ABD & PELVIS W/CONTRAST: CPT

## 2022-09-15 PROCEDURE — 80053 COMPREHEN METABOLIC PANEL: CPT | Performed by: EMERGENCY MEDICINE

## 2022-09-15 PROCEDURE — 36415 COLL VENOUS BLD VENIPUNCTURE: CPT

## 2022-09-15 PROCEDURE — 96376 TX/PRO/DX INJ SAME DRUG ADON: CPT

## 2022-09-15 PROCEDURE — 99284 EMERGENCY DEPT VISIT MOD MDM: CPT

## 2022-09-15 PROCEDURE — 96375 TX/PRO/DX INJ NEW DRUG ADDON: CPT

## 2022-09-15 PROCEDURE — G1004 CDSM NDSC: HCPCS

## 2022-09-15 PROCEDURE — 85025 COMPLETE CBC W/AUTO DIFF WBC: CPT | Performed by: EMERGENCY MEDICINE

## 2022-09-15 PROCEDURE — 83690 ASSAY OF LIPASE: CPT | Performed by: EMERGENCY MEDICINE

## 2022-09-15 PROCEDURE — 81001 URINALYSIS AUTO W/SCOPE: CPT | Performed by: EMERGENCY MEDICINE

## 2022-09-15 PROCEDURE — 96361 HYDRATE IV INFUSION ADD-ON: CPT

## 2022-09-15 RX ORDER — KETOROLAC TROMETHAMINE 30 MG/ML
15 INJECTION, SOLUTION INTRAMUSCULAR; INTRAVENOUS ONCE
Status: COMPLETED | OUTPATIENT
Start: 2022-09-15 | End: 2022-09-15

## 2022-09-15 RX ORDER — ACETAMINOPHEN 325 MG/1
650 TABLET ORAL EVERY 6 HOURS PRN
Qty: 30 TABLET | Refills: 0 | Status: SHIPPED | OUTPATIENT
Start: 2022-09-15

## 2022-09-15 RX ORDER — ONDANSETRON 4 MG/1
4 TABLET, ORALLY DISINTEGRATING ORAL EVERY 6 HOURS PRN
Qty: 20 TABLET | Refills: 0 | Status: SHIPPED | OUTPATIENT
Start: 2022-09-15

## 2022-09-15 RX ORDER — MORPHINE SULFATE 4 MG/ML
4 INJECTION, SOLUTION INTRAMUSCULAR; INTRAVENOUS AS NEEDED
Status: COMPLETED | OUTPATIENT
Start: 2022-09-15 | End: 2022-09-15

## 2022-09-15 RX ORDER — ONDANSETRON 2 MG/ML
4 INJECTION INTRAMUSCULAR; INTRAVENOUS ONCE
Status: COMPLETED | OUTPATIENT
Start: 2022-09-15 | End: 2022-09-15

## 2022-09-15 RX ADMIN — SODIUM CHLORIDE 1000 ML: 0.9 INJECTION, SOLUTION INTRAVENOUS at 01:29

## 2022-09-15 RX ADMIN — IOHEXOL 100 ML: 350 INJECTION, SOLUTION INTRAVENOUS at 03:02

## 2022-09-15 RX ADMIN — MORPHINE SULFATE 4 MG: 4 INJECTION INTRAVENOUS at 02:59

## 2022-09-15 RX ADMIN — ONDANSETRON 4 MG: 2 INJECTION INTRAMUSCULAR; INTRAVENOUS at 01:29

## 2022-09-15 RX ADMIN — KETOROLAC TROMETHAMINE 15 MG: 30 INJECTION, SOLUTION INTRAMUSCULAR at 01:29

## 2022-09-15 RX ADMIN — MORPHINE SULFATE 4 MG: 4 INJECTION INTRAVENOUS at 01:50

## 2022-09-15 NOTE — Clinical Note
Shweta Jessica was seen and treated in our emergency department on 9/15/2022  Diagnosis:     [employer]  is off the rest of the shift today, may return to work on return date  He may return on this date: 09/17/2022         If you have any questions or concerns, please don't hesitate to call        Tanner Dawson    ______________________________           _______________          _______________  Hospital Representative                              Date                                Time

## 2022-09-15 NOTE — ED PROVIDER NOTES
History  Chief Complaint   Patient presents with    Abdominal Pain     Patient comes in reporting LUQ pain radiating to LLQ and across lower abd  States it started aprox 6 hours ago  +N -V/D  Last BM this am     Patient is a 69-year-old male with past medical history of asthma presenting for evaluation of 6 hours of abdominal pain associated with nausea  He notes the pain started in the left upper quadrant and radiates down to his lower abdomen, is constant, with acute spikes in pain that feels like sharp/stabbing  He denies fevers but endorses chills  He denies sore throat, cough, CP/SOB, vomiting, diarrhea, urinary changes, and skin complaints  He notes nausea and belching, but no active retching or vomiting  He denies sick contacts  He notes his last BM was this morning, normal, without blood/black tarry appearance  He denies prior surgery on his abdomen        History provided by:  Patient   used: No    Abdominal Pain  Pain location:  LUQ, LLQ and RLQ  Pain quality: burning, sharp and stabbing    Pain radiates to:  LLQ and RLQ  Pain severity:  Moderate  Onset quality:  Sudden  Duration:  6 hours  Timing:  Constant  Progression:  Worsening  Chronicity:  New  Context: not alcohol use, not awakening from sleep, not diet changes, not eating, not laxative use, not previous surgeries, not recent illness, not recent travel, not retching, not sick contacts, not suspicious food intake and not trauma    Relieved by:  Nothing  Worsened by:  Palpation and deep breathing  Ineffective treatments:  None tried  Associated symptoms: belching, chills and nausea    Associated symptoms: no chest pain, no constipation, no cough, no diarrhea, no dysuria, no fatigue, no fever, no flatus, no hematuria, no shortness of breath, no sore throat and no vomiting    Nausea:     Severity:  Mild    Onset quality:  Sudden    Duration:  6 hours    Timing:  Constant    Progression:  Unchanged  Risk factors: no alcohol abuse, no aspirin use, not elderly, has not had multiple surgeries, no NSAID use, not obese and no recent hospitalization        Prior to Admission Medications   Prescriptions Last Dose Informant Patient Reported? Taking? albuterol (PROVENTIL HFA,VENTOLIN HFA) 90 mcg/act inhaler  Self Yes No   Sig: Inhale 2 puffs every 6 (six) hours as needed for wheezing   ergocalciferol (VITAMIN D2) 50,000 units  Self Yes No   Sig: Take 50,000 Units by mouth once a week      Facility-Administered Medications: None       Past Medical History:   Diagnosis Date    Asthma     Bipolar disorder ([facility] [address] )        Past Surgical History:   Procedure Laterality Date    FOOT SURGERY Right 2014    SIMPLE MASTECTOMY Right 2018    Procedure: MASTECTOMY SIMPLE: NIPPLE SPARING;  Surgeon: Ivy Haywood MD;  [facility];  Service: Surgical Oncology    WISDOM TOOTH EXTRACTION         Family History   Problem Relation Age of Onset    Breast cancer Mother     Breast cancer Cousin      I have reviewed and agree with the history as documented  E-Cigarette/Vaping    E-Cigarette Use Former User      E-Cigarette/Vaping Substances    Nicotine Yes     THC No     CBD No     Flavoring No     Other No     Unknown No      Social History     Tobacco Use    Smoking status: Former Smoker     Packs/day: 0 50     Quit date: 2018     Years since quittin 6    Smokeless tobacco: Current User    Tobacco comment: nicotine pouch 3mg 5 times per day   Vaping Use    Vaping Use: Former    Substances: Nicotine   Substance Use Topics    Alcohol use: Yes     Comment: socially    Drug use: Yes     Types: Marijuana     Comment: daily       Review of Systems   Constitutional: Positive for appetite change (Decreased) and chills  Negative for fatigue and fever  HENT: Negative for ear pain and sore throat  Eyes: Negative for pain and visual disturbance  Respiratory: Negative for cough and shortness of breath      Cardiovascular: Negative for chest pain, palpitations and leg swelling  Gastrointestinal: Positive for abdominal pain and nausea  Negative for abdominal distention, constipation, diarrhea, flatus and vomiting  Genitourinary: Negative for decreased urine volume, difficulty urinating, dysuria, flank pain, frequency, hematuria, penile discharge, penile pain, penile swelling, scrotal swelling, testicular pain and urgency  Musculoskeletal: Negative for arthralgias, back pain and gait problem (Reports abdominal pain worsened by ambulation)  Skin: Negative for color change and rash  Allergic/Immunologic: Negative for immunocompromised state  Neurological: Negative for dizziness, seizures, syncope, light-headedness and headaches  All other systems reviewed and are negative  Physical Exam  Physical Exam  Vitals and nursing note reviewed  Constitutional:       General: He is in acute distress  Appearance: Normal appearance  He is well-developed and normal weight  He is ill-appearing  He is not toxic-appearing or diaphoretic  HENT:      Head: Normocephalic and atraumatic  Nose: Nose normal  No congestion or rhinorrhea  Mouth/Throat:      Lips: Pink  No lesions  Mouth: Mucous membranes are moist       Pharynx: Oropharynx is clear  No oropharyngeal exudate or posterior oropharyngeal erythema  Eyes:      Extraocular Movements: Extraocular movements intact  Conjunctiva/sclera: Conjunctivae normal    Neck:      Trachea: Trachea and phonation normal  No abnormal tracheal secretions  Cardiovascular:      Rate and Rhythm: Normal rate and regular rhythm  Pulses: Normal pulses  Radial pulses are 2+ on the right side and 2+ on the left side  Dorsalis pedis pulses are 2+ on the right side and 2+ on the left side  Heart sounds: Normal heart sounds, S1 normal and S2 normal  No murmur heard  Pulmonary:      Effort: Pulmonary effort is normal  No tachypnea or respiratory distress  Breath sounds: Normal breath sounds and air entry  No stridor, decreased air movement or transmitted upper airway sounds  No decreased breath sounds, wheezing, rhonchi or rales  Abdominal:      General: There is no distension  Palpations: Abdomen is soft  Tenderness: There is abdominal tenderness in the right lower quadrant and periumbilical area  There is no right CVA tenderness, left CVA tenderness, guarding or rebound  Positive signs include McBurney's sign  Negative signs include Gupta's sign and Rovsing's sign  Musculoskeletal:         General: Normal range of motion  Cervical back: Normal range of motion and neck supple  Right lower leg: No edema  Left lower leg: No edema  Comments: PAYNE, 5/5 strength throughout, sensation intact, no focal joint swelling, ambulatory with steady gait   Skin:     General: Skin is warm and dry  Capillary Refill: Capillary refill takes less than 2 seconds  Findings: No rash or wound  Neurological:      General: No focal deficit present  Mental Status: He is alert and oriented to person, place, and time  Mental status is at baseline  GCS: GCS eye subscore is 4  GCS verbal subscore is 5  GCS motor subscore is 6           Vital Signs  ED Triage Vitals [09/15/22 0112]   Temperature Pulse Respirations Blood Pressure SpO2   (!) 97 4 °F (36 3 °C) 84 18 132/82 100 %      Temp Source Heart Rate Source Patient Position - Orthostatic VS BP Location FiO2 (%)   Oral Monitor Sitting Right arm --      Pain Score       7           Vitals:    09/15/22 0112 09/15/22 0150 09/15/22 0415   BP: 132/82 118/67 118/72   Pulse: 84  79   Patient Position - Orthostatic VS: Sitting Lying Lying         Visual Acuity      ED Medications  Medications   sodium chloride 0 9 % bolus 1,000 mL (0 mL Intravenous Stopped 9/15/22 0229)   ondansetron (ZOFRAN) injection 4 mg (4 mg Intravenous Given 9/15/22 0129)   ketorolac (TORADOL) injection 15 mg (15 mg Intravenous Given 9/15/22 0129)   morphine injection 4 mg (4 mg Intravenous Given 9/15/22 0259)   iohexol (OMNIPAQUE) 350 MG/ML injection (SINGLE-DOSE) 100 mL (100 mL Intravenous Given 9/15/22 0302)       Diagnostic Studies  Results Reviewed     Procedure Component Value Units Date/Time    [place] draw [[id number]] Collected: 09/15/22 0114    Lab Status: Final result Specimen: Blood from Arm, Left Updated: 09/15/22 0303    Narrative: The following orders were created for panel order [place] draw  Procedure                               Abnormality         Status                     ---------                               -----------         ------                     Lang Conner Top on WGZC[[id number]]                           Final result               Gold top on KVFO[[id number]]                                 Final result               Green / Black tube on VFVS[[id number]]                       Final result                 Please view results for these tests on the individual orders      Comprehensive metabolic panel [[id number]]  (Abnormal) Collected: 09/15/22 0114    Lab Status: Final result Specimen: Blood from Arm, Left Updated: 09/15/22 0238     Sodium 138 mmol/L      Potassium 3 9 mmol/L      Chloride 105 mmol/L      CO2 27 mmol/L      ANION GAP 6 mmol/L      BUN 14 mg/dL      Creatinine 1 28 mg/dL      Glucose 88 mg/dL      Calcium 9 5 mg/dL      AST 11 U/L      ALT 7 U/L      Alkaline Phosphatase 77 U/L      Total Protein 7 4 g/dL      Albumin 4 7 g/dL      Total Bilirubin 0 43 mg/dL      eGFR 74 ml/min/1 73sq m     Narrative:      [place] guidelines for Chronic Kidney Disease (CKD):     Stage 1 with normal or high GFR (GFR > 90 mL/min/1 73 square meters)    Stage 2 Mild CKD (GFR = 60-89 mL/min/1 73 square meters)    Stage 3A Moderate CKD (GFR = 45-59 mL/min/1 73 square meters)    Stage 3B Moderate CKD (GFR = 30-44 mL/min/1 73 square meters)    Stage 4 Severe CKD (GFR = 15-29 mL/min/1 73 square meters)    Stage 5 End Stage CKD (GFR <15 mL/min/1 73 square meters)  Note: GFR calculation is accurate only with a steady state creatinine    Lipase [[id number]]  (Normal) Collected: 09/15/22 0114    Lab Status: Final result Specimen: Blood from Arm, Left Updated: 09/15/22 0238     Lipase 41 u/L     Urine Microscopic [[id number]]  (Abnormal) Collected: 09/15/22 0122    Lab Status: Final result Specimen: Urine, Clean Catch Updated: 09/15/22 0135     RBC, UA 1-2 /hpf      WBC, UA 1-2 /hpf      Epithelial Cells Occasional /hpf      Bacteria, UA None Seen /hpf      MUCUS THREADS Occasional    UA (URINE) with reflex to Scope [[id number]]  (Abnormal) Collected: 09/15/22 0122    Lab Status: Final result Specimen: Urine, Clean Catch Updated: 09/15/22 0132     Color, UA Yellow     Clarity, UA Clear     Specific Gravity, UA 1 035     pH, UA 5 5     Leukocytes, UA Negative     Nitrite, UA Negative     Protein, UA Trace mg/dl      Glucose, UA Negative mg/dl      Ketones, UA Trace mg/dl      Urobilinogen, UA 2 0 mg/dl      Bilirubin, UA Negative     Occult Blood, UA Large    CBC and differential [[id number]] Collected: 09/15/22 0114    Lab Status: Final result Specimen: Blood from Arm, Left Updated: 09/15/22 0127     WBC 8 79 Thousand/uL      RBC 4 94 Million/uL      Hemoglobin 15 4 g/dL      Hematocrit 46 1 %      MCV 93 fL      MCH 31 2 pg      MCHC 33 4 g/dL      RDW 11 9 %      MPV 10 2 fL      Platelets 464 Thousands/uL      nRBC 0 /100 WBCs      Neutrophils Relative 66 %      Immat GRANS % 0 %      Lymphocytes Relative 20 %      Monocytes Relative 9 %      Eosinophils Relative 4 %      Basophils Relative 1 %      Neutrophils Absolute 5 79 Thousands/µL      Immature Grans Absolute 0 02 Thousand/uL      Lymphocytes Absolute 1 72 Thousands/µL      Monocytes Absolute 0 82 Thousand/µL      Eosinophils Absolute 0 39 Thousand/µL      Basophils Absolute 0 05 Thousands/µL                  CT abdomen pelvis with contrast   Final Result by Chris Gilbert MD (09/15 1661)      Narrowing between the proximal superior mesenteric artery and aorta as described, which is progressively narrowing when compared to prior CTs  With clinical history of hematuria, this suggests "nut-cracker" syndrome, as the narrowed space could compress    the left renal vein  Recommend follow-up with vascular surgery  Additionally, recommend correlation for symptoms related to SMA syndrome as this narrowed space can also compresses the 4th portion of the duodenum  Workstation performed: [id number]                    Procedures  Procedures         ED Course  ED Course as of 09/15/22 0502   Thu Sep 15, 2022   0121 First nurse orders placed prior to my evaluation   0134 CBC and differential  No leukocytosis or anemima   0135 UA (URINE) with reflex to Scope(!)  No evidence of infection with negative leuks and nitrities  ? renal stone given large amount of blood and pt with abdominal pain  Negative for CVA tenderness   0136 Urine Microscopic(!)  Less RBC visualized, obtaining CTAP to r/o renal stone   0250 Comprehensive metabolic panel(!)  No metabolic abnormality, creatinine within normal limits, no transaminitis   0250 Lipase  Negative for acute abnormality   0353 CT abdomen pelvis with contrast  IMPRESSION:     Narrowing between the proximal superior mesenteric artery and aorta as described, which is progressively narrowing when compared to prior CTs  With clinical history of hematuria, this suggests "nut-cracker" syndrome, as the narrowed space could compress   the left renal vein  Recommend follow-up with vascular surgery  Additionally, recommend correlation for symptoms related to SMA syndrome as this narrowed space can also compresses the 4th portion of the duodenum  6470 Patient with improved pain to 2/10  He requests discharge as he has to care for his dog at home    On reexamination, patient continues to have right lower quadrant, periumbilical, and left upper quadrant abdominal pain  Patient encouraged to stay for p o  challenge  Patient defers and notes that he will take swab also to fluids and advance his diet as tolerated  SBIRT 22yo+    Flowsheet Row Most Recent Value   SBIRT (25 yo +)    In order to provide better care to our patients, we are screening all of our patients for alcohol and drug use  Would it be okay to ask you these screening questions? Unable to answer at this time Filed at: 09/15/2022 0115                    MDM  Number of Diagnoses or Management Options  Left upper quadrant abdominal pain: new and requires workup  Diagnosis management comments: Abdominal pain: CMP, lipase, CBC, CTAP w/ contrast, IVF, zofran, morphine x2    DDx including but not limited to: appendicitis, gastroenteritis, gastritis, PUD, GERD, gastroparesis, hepatitis, pancreatitis, colitis, enteritis, food poisoning, mesenteric adenitis, IBD, IBS, ileus, bowel obstruction, intussusception, volvulus, cholecystitis, biliary colic, choledocholithiasis, perforated viscus, splenic etiology, SMA syndrome, constipation, pelvic pathology, renal colic, pyelonephritis, UTI  Amount and/or Complexity of Data Reviewed  Clinical lab tests: ordered and reviewed  Tests in the radiology section of CPT®: ordered and reviewed  Decide to obtain previous medical records or to obtain history from someone other than the patient: yes  Review and summarize past medical records: yes  Discuss the patient with other providers: yes (Dr Damon Miller)    Risk of Complications, Morbidity, and/or Mortality  General comments: Assessment:  Patient is a 56-year-old male presenting for evaluation of 6 hours of left-sided abdominal pain associated with nausea  His blood work was grossly unremarkable  His initial UA was notable for blood but under microscopic examination he did not have rbc's or wbc's to indicate a renal pathology    CT abdomen pelvis suggested possible "nutcracker" syndrome given the patient's hematuria, however his microscopic did not have elevated rbc's making this less likely  When asked about the risk factors relating to SMA syndrome including rapid weight loss, patient does endorse 60 lb weight loss in 1 year due to stress  He notes the weight loss was not rapid but more progressive  He has a lean build and I cannot exclude the diagnosis of SMA syndrome given his body habitus and CT imaging  Patient referred to vascular surgery for further evaluation of this CT result  Patient with improved pain from 10/10 to 2/10 with 2 doses of IV morphine  He notes his nausea has improved since IV Zofran  His vital signs have remained stable while observed in the emergency department  DC paperwork was reviewed by myself with emphasis on strict return precautions and follow-up with vascular surgery  Patient reports that he will follow-up with vascular surgery and notify his primary care provider of his visit  Patient with improved appearance, nontoxic, ambulatory with steady gait at time of discharge  Patient Progress  Patient progress: improved      Disposition  Final diagnoses:   Left upper quadrant abdominal pain     Time reflects when diagnosis was documented in both MDM as applicable and the Disposition within this note     Time User Action Codes Description Comment    9/15/2022  4:01 AM Matthias Wharton Add [R10 12] Left upper quadrant abdominal pain       ED Disposition     ED Disposition   Discharge    Condition   Stable    Date/Time   Thu Sep 15, 2022  4:01 AM    Comment   Masha Juares discharge to home/self care                 Follow-up Information     Follow up With Specialties Details Why Contact Info Additional Information    Florence De Jesus MD Family Medicine Schedule an appointment as soon as possible for a visit in 1 week  [facility] [address] Emergency Department Emergency Medicine Go to  If symptoms worsen [address] [facility] [address] [facility] Emergency Department, [address], [place], [place], [address]    [facility] [address] [place] Vascular Surgery Call today To schedule an appointment [address] [facility] [address] [phone] [facility] [address] [place], [address], [address] [place], [place], [address] [phone]          Discharge Medication List as of 9/15/2022  4:08 AM      START taking these medications    Details   acetaminophen (TYLENOL) 325 mg tablet Take 2 tablets (650 mg total) by mouth every 6 (six) hours as needed for mild pain, Starting Thu 9/15/2022, Normal      ondansetron (Zofran ODT) 4 mg disintegrating tablet Take 1 tablet (4 mg total) by mouth every 6 (six) hours as needed for nausea or vomiting, Starting Thu 9/15/2022, Normal         CONTINUE these medications which have NOT CHANGED    Details   albuterol (PROVENTIL HFA,VENTOLIN HFA) 90 mcg/act inhaler Inhale 2 puffs every 6 (six) hours as needed for wheezing, Historical Med      ergocalciferol (VITAMIN D2) 50,000 units Take 50,000 Units by mouth once a week, Starting Wed 4/11/2018, Historical Med                 PDMP Review     None          ED Provider  Electronically Signed by           CYNTHIA Abbasi  09/15/22 7121

## 2022-09-15 NOTE — DISCHARGE INSTRUCTIONS
Call the number listed for the vascular surgeon to schedule a follow up appointment  Call in notify her primary care provider your visit today and schedule follow-up with him as well  Continue to monitor symptoms at home and take Tylenol as needed for pain  Take prescribed Zofran as needed for nausea  Maintain a bland diet and increase your fluid intake by taking small sips throughout the day  Speak to the vascular surgeon regarding the following CT finding:  "Narrowing between the proximal superior mesenteric artery and aorta as described, which is progressively narrowing when compared to prior CTs  With clinical history of hematuria, this suggests "nut-cracker" syndrome, as the narrowed space could compress   the left renal vein  Recommend follow-up with vascular surgery    Additionally, recommend correlation for symptoms related to SMA syndrome as this narrowed space can also compresses the 4th portion of the duodenum "

## 2023-08-15 ENCOUNTER — TELEPHONE (OUTPATIENT)
Dept: PSYCHIATRY | Facility: CLINIC | Age: 33
End: 2023-08-15

## 2023-08-15 NOTE — LETTER
Dear Arlen Wynn : We are contacting you because your name is currently included on the [address] [place] [address] wait-list for Talk Therapy and/or Medication Management. (Please [place] which services are needed)     In our efforts to provide the highest quality care, [facility] has begun the process of upgrading our behavioral health systems to increase efficiency and expedite delivery of services. As part of this process, we ask you to please confirm your continued interest in the services above. If you are no longer interested or in need, please liz “No” in the area below. If you are still interested and in need, please liz “Yes” and provide your most current demographic and insurance information within 15 days. If we do not receive confirmation from you by 2023 your information will not be included in the system upgrade and your place on the waitlist will be lost.     Thank you in advance for your patience and understanding and we apologize for any inconvenience this may cause. Patient Name and :    Still in need of services: Yes or No     Current Address:     Phone#:     Best time to receive a call: Insurance Carrier:      Policy/ID#: Group#: Insurance Services Phone#:      What is your current presenting problem? Open to virtual talk therapy: Yes or No      We will call you to do an Intake when an appointment becomes available. You can send this information back to us in any of the ways below:    Email: [email or web address] Valencia  Fax#:  [phone]  Mail:   [address] [place] [address]             [address] [facility], [address] [place] [address]

## 2025-06-15 ENCOUNTER — HOSPITAL ENCOUNTER (EMERGENCY)
Facility: HOSPITAL | Age: 35
Discharge: HOME | End: 2025-06-15
Attending: EMERGENCY MEDICINE | Admitting: EMERGENCY MEDICINE
Payer: COMMERCIAL

## 2025-06-15 VITALS
DIASTOLIC BLOOD PRESSURE: 80 MMHG | HEART RATE: 99 BPM | TEMPERATURE: 98.3 F | SYSTOLIC BLOOD PRESSURE: 126 MMHG | OXYGEN SATURATION: 99 % | RESPIRATION RATE: 18 BRPM

## 2025-06-15 DIAGNOSIS — N45.1 EPIDIDYMITIS: Primary | ICD-10-CM

## 2025-06-15 LAB
AMORPH URATE CRY URNS QL MICRO: ABNORMAL
BACTERIA UR QL AUTO: ABNORMAL /HPF
BILIRUB UR QL STRIP: NEGATIVE
CLARITY UR: ABNORMAL
COLOR UR: YELLOW
GLUCOSE UR STRIP-MCNC: NEGATIVE MG/DL
HGB UR QL STRIP.AUTO: NEGATIVE
KETONES UR STRIP-MCNC: NEGATIVE MG/DL
LEUKOCYTE ESTERASE UR QL STRIP: NEGATIVE
MUCOUS THREADS UR QL AUTO: ABNORMAL
NITRITE UR QL STRIP: NEGATIVE
NON-SQ EPI CELLS URNS QL MICRO: ABNORMAL /HPF
PH UR STRIP.AUTO: 8.5 [PH]
PROT UR STRIP-MCNC: ABNORMAL MG/DL
RBC #/AREA URNS AUTO: ABNORMAL /HPF
SP GR UR STRIP.AUTO: 1.03 (ref 1–1.03)
UROBILINOGEN UR STRIP-ACNC: 4 MG/DL
WBC #/AREA URNS AUTO: ABNORMAL /HPF

## 2025-06-15 PROCEDURE — 87491 CHLMYD TRACH DNA AMP PROBE: CPT

## 2025-06-15 PROCEDURE — 96372 THER/PROPH/DIAG INJ SC/IM: CPT

## 2025-06-15 PROCEDURE — 99284 EMERGENCY DEPT VISIT MOD MDM: CPT

## 2025-06-15 PROCEDURE — 87591 N.GONORRHOEAE DNA AMP PROB: CPT

## 2025-06-15 PROCEDURE — 81001 URINALYSIS AUTO W/SCOPE: CPT

## 2025-06-15 PROCEDURE — 99284 EMERGENCY DEPT VISIT MOD MDM: CPT | Performed by: EMERGENCY MEDICINE

## 2025-06-15 RX ORDER — DOXYCYCLINE 100 MG/1
100 CAPSULE ORAL 2 TIMES DAILY
Qty: 20 CAPSULE | Refills: 0 | Status: SHIPPED | OUTPATIENT
Start: 2025-06-15 | End: 2025-06-25

## 2025-06-15 RX ORDER — DOXYCYCLINE 100 MG/1
100 CAPSULE ORAL ONCE
Status: COMPLETED | OUTPATIENT
Start: 2025-06-15 | End: 2025-06-15

## 2025-06-15 RX ADMIN — DOXYCYCLINE 100 MG: 100 CAPSULE ORAL at 12:16

## 2025-06-15 RX ADMIN — LIDOCAINE HYDROCHLORIDE 500 MG: 10 INJECTION, SOLUTION EPIDURAL; INFILTRATION; INTRACAUDAL; PERINEURAL at 12:16

## 2025-06-15 NOTE — DISCHARGE INSTRUCTIONS
Please take doxycycline as prescribed. Tylenol, ibuprofen every 6-8 hrs as needed for pain. Testicle elevation to aid in swelling.,     Please follow up with your pcp and urology within the next week for further eval. Return to ED if any worsening symptoms.

## 2025-06-15 NOTE — ED PROVIDER NOTES
Time reflects when diagnosis was documented in both MDM as applicable and the Disposition within this note       Time User Action Codes Description Comment    6/15/2025 12:36 PM Adrian Steve Add [N45.1] Epididymitis           ED Disposition       ED Disposition   Discharge    Condition   Stable    Date/Time   Sun Prabhu 15, 2025 12:37 PM    Comment   Joe Panovec discharge to home/self care.                   Assessment & Plan       Medical Decision Making  See ED course    Amount and/or Complexity of Data Reviewed  Labs: ordered.    Risk  Prescription drug management.        ED Course as of 06/15/25 1435   Sun Prabhu 15, 2025   1139 34-year-old medically healthy patient presenting for testicle swelling.  Patient states over the past week he has noted intermittent episodes of mild throbbing pain to the left testicle.  No trauma.  2 days ago noticed swelling to the left testicle.  Otherwise no fevers, drainage, urinary symptoms, no other acute symptoms.  Is currently sexually active with his girlfriend without regular protection.  Denies any other acute symptoms.   1150 Exam and history most consistent with epididymitis given point tenderness palpation over the epididymis and gradual onset.  No concerning features for testicular torsion given reassuring history and reproducible point tenderness palpation.  Intact cremasteric reflex.   1435 Patient prescribed course of doxycycline and provided one-time dose of ceftriaxone.  Discharged with outpatient follow-up.       Medications   cefTRIAXone (ROCEPHIN) 500 mg in lidocaine (PF) (XYLOCAINE-MPF) 1 % IM only syringe (500 mg Intramuscular Given 6/15/25 1216)   doxycycline hyclate (VIBRAMYCIN) capsule 100 mg (100 mg Oral Given 6/15/25 1216)       ED Risk Strat Scores                    No data recorded                            History of Present Illness       Chief Complaint   Patient presents with    Testicle Swelling     Testicle pain since last week. Swelling to left  testicle noticed 2 days ago. Denies injury       Past Medical History[1]   Past Surgical History[2]   Family History[3]   Social History[4]   E-Cigarette/Vaping    E-Cigarette Use Former User       E-Cigarette/Vaping Substances    Nicotine Yes     THC No     CBD No     Flavoring No     Other No     Unknown No       I have reviewed and agree with the history as documented.     34-year-old medically healthy patient presenting for testicle swelling.  Patient states over the past week he has noted intermittent episodes of mild throbbing pain to the left testicle.  No trauma.  2 days ago noticed swelling to the left testicle.  Otherwise no fevers, drainage, urinary symptoms, no other acute symptoms.  Is currently sexually active with his girlfriend without regular protection.  Denies any other acute symptoms.          Review of Systems   Constitutional:  Negative for fever.   Respiratory:  Negative for shortness of breath.    Cardiovascular:  Negative for chest pain.   Gastrointestinal:  Negative for abdominal pain, nausea and vomiting.   Genitourinary:  Positive for scrotal swelling and testicular pain. Negative for dysuria, frequency, penile pain, penile swelling and urgency.   Musculoskeletal:  Negative for arthralgias, back pain, joint swelling, myalgias and neck pain.   Skin:  Negative for color change and wound.   Neurological:  Negative for weakness, numbness and headaches.           Objective       ED Triage Vitals [06/15/25 1139]   Temperature Pulse Blood Pressure Respirations SpO2 Patient Position - Orthostatic VS   98.3 °F (36.8 °C) (!) 120 126/80 18 99 % Sitting      Temp Source Heart Rate Source BP Location FiO2 (%) Pain Score    Oral Monitor Right arm -- 8      Vitals      Date and Time Temp Pulse SpO2 Resp BP Pain Score FACES Pain Rating User   06/15/25 1236 -- 99 -- -- -- -- -- WD   06/15/25 1139 98.3 °F (36.8 °C) 120 99 % 18 126/80 8 -- KB            Physical Exam  Vitals and nursing note reviewed.    Constitutional:       General: He is not in acute distress.     Appearance: He is well-developed.   HENT:      Head: Normocephalic and atraumatic.     Eyes:      Conjunctiva/sclera: Conjunctivae normal.       Cardiovascular:      Rate and Rhythm: Normal rate and regular rhythm.   Pulmonary:      Effort: Pulmonary effort is normal. No respiratory distress.   Abdominal:      Palpations: Abdomen is soft.      Tenderness: There is no abdominal tenderness.   Genitourinary:     Penis: Normal.       Comments: Focal swelling to the superior left testicle noted without diffuse generalized scrotal swelling.  Point tenderness palpation to the superior aspect of the left testicle without increased tenderness palpation to the lower testicle.  Intact cremasteric reflexes.    Musculoskeletal:         General: No swelling.      Cervical back: Neck supple.     Skin:     General: Skin is warm and dry.      Capillary Refill: Capillary refill takes less than 2 seconds.     Neurological:      Mental Status: He is alert.     Psychiatric:         Mood and Affect: Mood normal.         Results Reviewed       Procedure Component Value Units Date/Time    Urine Microscopic [[id number]]  (Abnormal) Collected: 06/15/25 1200    Lab Status: Final result Specimen: Urine, Clean Catch Updated: 06/15/25 1250     RBC, UA None Seen /hpf      WBC, UA None Seen /hpf      Epithelial Cells None Seen /hpf      Bacteria, UA None Seen /hpf      MUCUS THREADS Occasional     Amorphous Crystals, UA Moderate    UA w Reflex to Microscopic w Reflex to Culture [[id number]]  (Abnormal) Collected: 06/15/25 1200    Lab Status: Final result Specimen: Urine, Clean Catch Updated: 06/15/25 1238     Color, UA Yellow     Clarity, UA Extra Turbid     Specific Gravity, UA 1.027     pH, UA 8.5     Leukocytes, UA Negative     Nitrite, UA Negative     Protein, UA Trace mg/dl      Glucose, UA Negative mg/dl      Ketones, UA Negative mg/dl      Urobilinogen, UA 4.0 mg/dl       Bilirubin, UA Negative     Occult Blood, UA Negative    Chlamydia/GC amplified DNA by PCR [[id number]] Collected: 06/15/25 1201    Lab Status: In process Specimen: Urine, Other Updated: 06/15/25 1214            No orders to display       Procedures    ED Medication and Procedure Management   Prior to Admission Medications   Prescriptions Last Dose Informant Patient Reported? Taking?   acetaminophen (TYLENOL) 325 mg tablet   No No   Sig: Take 2 tablets (650 mg total) by mouth every 6 (six) hours as needed for mild pain   albuterol (PROVENTIL HFA,VENTOLIN HFA) 90 mcg/act inhaler  Self Yes No   Sig: Inhale 2 puffs every 6 (six) hours as needed for wheezing   ergocalciferol (VITAMIN D2) 50,000 units  Self Yes No   Sig: Take 50,000 Units by mouth once a week   ondansetron (Zofran ODT) 4 mg disintegrating tablet   No No   Sig: Take 1 tablet (4 mg total) by mouth every 6 (six) hours as needed for nausea or vomiting      Facility-Administered Medications: None     Discharge Medication List as of 6/15/2025 12:37 PM        START taking these medications    Details   doxycycline hyclate (VIBRAMYCIN) 100 mg capsule Take 1 capsule (100 mg total) by mouth 2 (two) times a day for 10 days, Starting Sun 6/15/2025, Until Wed 6/25/2025, Normal           CONTINUE these medications which have NOT CHANGED    Details   acetaminophen (TYLENOL) 325 mg tablet Take 2 tablets (650 mg total) by mouth every 6 (six) hours as needed for mild pain, Starting u 9/15/2022, Normal      albuterol (PROVENTIL HFA,VENTOLIN HFA) 90 mcg/act inhaler Inhale 2 puffs every 6 (six) hours as needed for wheezing, Historical Med      ergocalciferol (VITAMIN D2) 50,000 units Take 50,000 Units by mouth once a week, Starting Wed 4/11/2018, Historical Med      ondansetron (Zofran ODT) 4 mg disintegrating tablet Take 1 tablet (4 mg total) by mouth every 6 (six) hours as needed for nausea or vomiting, Starting Thu 9/15/2022, Normal           No discharge procedures on  file.  ED SEPSIS DOCUMENTATION   Time reflects when diagnosis was documented in both MDM as applicable and the Disposition within this note       Time User Action Codes Description Comment    6/15/2025 12:36 PM Steve Campbell Add [N45.1] Epididymitis                    [1]   Past Medical History:  Diagnosis Date    Asthma     Bipolar disorder (HCC)    [2]   Past Surgical History:  Procedure Laterality Date    FOOT SURGERY Right     SIMPLE MASTECTOMY Right 2018    Procedure: MASTECTOMY SIMPLE: NIPPLE SPARING;  Surgeon: Mitchell Land MD;  Location: [facility];  Service: Surgical Oncology    WISDOM TOOTH EXTRACTION     [3]   Family History  Problem Relation Name Age of Onset    Breast cancer Mother      Breast cancer Cousin     [4]   Social History  Tobacco Use    Smoking status: Former     Current packs/day: 0.00     Types: Cigarettes     Quit date: 2018     Years since quittin.4    Smokeless tobacco: Current    Tobacco comments:     nicotine pouch 3mg 5 times per day   Vaping Use    Vaping status: Former    Substances: Nicotine   Substance Use Topics    Alcohol use: Yes     Comment: socially    Drug use: Yes     Types: Marijuana     Comment: daily        Steve Campbell MD  06/15/25 1528

## 2025-06-16 LAB
C TRACH DNA SPEC QL NAA+PROBE: NEGATIVE
N GONORRHOEA DNA SPEC QL NAA+PROBE: NEGATIVE

## 2025-06-18 NOTE — ED ATTENDING ATTESTATION
6/15/2025  IRiccardo MD, saw and evaluated the patient. I have discussed the patient with the resident/non-physician practitioner and agree with the resident's/non-physician practitioner's findings, Plan of Care, and MDM as documented in the resident's/non-physician practitioner's note, except where noted. All available labs and Radiology studies were reviewed.  I was present for key portions of any procedure(s) performed by the resident/non-physician practitioner and I was immediately available to provide assistance.       At this point I agree with the current assessment done in the Emergency Department.  I have conducted an independent evaluation of this patient a history and physical is as follows:SEE H AND P ABOVE     ED Course  ED Course as of 06/18/25 1806   Sun Prabhu 15, 2025   1251 Er md note- pt seen and thoroughly evaluated by er md- case d/w er resident -- 34 yr male with gradual onset of 1 week of back of left testicle pain-- no trauma- no abrupt onset of left testicle pain with n/v- no flank renal colic  type pain -- no gu comps- avss- pulse ox 99 % on ra- interpretation is normal - no intervention - initial tachycardia resolved- rrr s1/s2-cta-b/ soft nt/nd- no cva tenderness- no peritoneal signs - posterior left testicle tenderness-  normal testicle lie- normal cremasteric reflex-- normal scrotal/perineal exam- no inguinal hernia's         Critical Care Time  Procedures      
no

## (undated) DEVICE — STERILE UNIVERSAL BREAST PACK: Brand: CARDINAL HEALTH

## (undated) DEVICE — INSULATED BLADE ELECTRODE;CAUTION: FOR MANUFACTURING, PROCESSING, OR REPACKING.: Brand: EDGE

## (undated) DEVICE — GLOVE SRG BIOGEL 7

## (undated) DEVICE — SUT VICRYL 2-0 SH 27 IN UNDYED J417H

## (undated) DEVICE — CHEST/BREAST DRAPE: Brand: CONVERTORS

## (undated) DEVICE — 3M™ STERI-STRIP™ REINFORCED ADHESIVE SKIN CLOSURES, R1547, 1/2 IN X 4 IN (12 MM X 100 MM), 6 STRIPS/ENVELOPE: Brand: 3M™ STERI-STRIP™

## (undated) DEVICE — LIGHT HANDLE COVER SLEEVE DISP BLUE STELLAR

## (undated) DEVICE — CHLORAPREP HI-LITE 26ML ORANGE

## (undated) DEVICE — INTENDED FOR TISSUE SEPARATION, AND OTHER PROCEDURES THAT REQUIRE A SHARP SURGICAL BLADE TO PUNCTURE OR CUT.: Brand: BARD-PARKER SAFETY BLADES SIZE 15, STERILE

## (undated) DEVICE — NEEDLE 25G X 1 1/2

## (undated) DEVICE — SMOKE EVACUATION TUBING WITH 8 IN INTEGRAL WAND AND SPONGE GUARD: Brand: BUFFALO FILTER

## (undated) DEVICE — INSULATED BLADE ELECTRODE: Brand: EDGE

## (undated) DEVICE — REM POLYHESIVE ADULT PATIENT RETURN ELECTRODE: Brand: VALLEYLAB

## (undated) DEVICE — SUT SILK 2-0 SH 30 IN K833H

## (undated) DEVICE — VIAL DECANTER

## (undated) DEVICE — SUT MONOCRYL 4-0 PS-2 18 IN Y496G

## (undated) DEVICE — SCD SEQUENTIAL COMPRESSION COMFORT SLEEVE MEDIUM KNEE LENGTH: Brand: KENDALL SCD